# Patient Record
Sex: MALE | Race: WHITE | NOT HISPANIC OR LATINO | Employment: FULL TIME | URBAN - METROPOLITAN AREA
[De-identification: names, ages, dates, MRNs, and addresses within clinical notes are randomized per-mention and may not be internally consistent; named-entity substitution may affect disease eponyms.]

---

## 2019-05-26 ENCOUNTER — APPOINTMENT (EMERGENCY)
Dept: CT IMAGING | Facility: HOSPITAL | Age: 29
End: 2019-05-26

## 2019-05-26 ENCOUNTER — HOSPITAL ENCOUNTER (OUTPATIENT)
Facility: HOSPITAL | Age: 29
Setting detail: OBSERVATION
Discharge: HOME | End: 2019-05-29
Attending: EMERGENCY MEDICINE | Admitting: INTERNAL MEDICINE

## 2019-05-26 DIAGNOSIS — L02.91 ABSCESS: ICD-10-CM

## 2019-05-26 DIAGNOSIS — L03.211 FACIAL CELLULITIS: Primary | ICD-10-CM

## 2019-05-26 DIAGNOSIS — K02.9 DENTAL CARIES: ICD-10-CM

## 2019-05-26 DIAGNOSIS — F17.200 NICOTINE DEPENDENCE: ICD-10-CM

## 2019-05-26 LAB
ALBUMIN SERPL BCP-MCNC: 3.6 G/DL (ref 3.5–5)
ALP SERPL-CCNC: 102 U/L (ref 46–116)
ALT SERPL W P-5'-P-CCNC: 47 U/L (ref 12–78)
ANION GAP SERPL CALCULATED.3IONS-SCNC: 8 MMOL/L (ref 4–13)
AST SERPL W P-5'-P-CCNC: 18 U/L (ref 5–45)
BASOPHILS # BLD AUTO: 0.07 THOUSANDS/ΜL (ref 0–0.1)
BASOPHILS NFR BLD AUTO: 1 % (ref 0–1)
BILIRUB SERPL-MCNC: 0.6 MG/DL (ref 0.2–1)
BUN SERPL-MCNC: 14 MG/DL (ref 5–25)
CALCIUM SERPL-MCNC: 8.7 MG/DL (ref 8.3–10.1)
CHLORIDE SERPL-SCNC: 103 MMOL/L (ref 100–108)
CO2 SERPL-SCNC: 27 MMOL/L (ref 21–32)
CREAT SERPL-MCNC: 1.14 MG/DL (ref 0.6–1.3)
EOSINOPHIL # BLD AUTO: 0.2 THOUSAND/ΜL (ref 0–0.61)
EOSINOPHIL NFR BLD AUTO: 2 % (ref 0–6)
ERYTHROCYTE [DISTWIDTH] IN BLOOD BY AUTOMATED COUNT: 11.9 % (ref 11.6–15.1)
GFR SERPL CREATININE-BSD FRML MDRD: 88 ML/MIN/1.73SQ M
GLUCOSE SERPL-MCNC: 115 MG/DL (ref 65–140)
HCT VFR BLD AUTO: 41.1 % (ref 36.5–49.3)
HGB BLD-MCNC: 14.3 G/DL (ref 12–17)
IMM GRANULOCYTES # BLD AUTO: 0.03 THOUSAND/UL (ref 0–0.2)
IMM GRANULOCYTES NFR BLD AUTO: 0 % (ref 0–2)
LACTATE SERPL-SCNC: 0.8 MMOL/L (ref 0.5–2)
LYMPHOCYTES # BLD AUTO: 1.66 THOUSANDS/ΜL (ref 0.6–4.47)
LYMPHOCYTES NFR BLD AUTO: 15 % (ref 14–44)
MCH RBC QN AUTO: 32.1 PG (ref 26.8–34.3)
MCHC RBC AUTO-ENTMCNC: 34.8 G/DL (ref 31.4–37.4)
MCV RBC AUTO: 92 FL (ref 82–98)
MONOCYTES # BLD AUTO: 1.06 THOUSAND/ΜL (ref 0.17–1.22)
MONOCYTES NFR BLD AUTO: 9 % (ref 4–12)
NEUTROPHILS # BLD AUTO: 8.44 THOUSANDS/ΜL (ref 1.85–7.62)
NEUTS SEG NFR BLD AUTO: 73 % (ref 43–75)
NRBC BLD AUTO-RTO: 0 /100 WBCS
PLATELET # BLD AUTO: 144 THOUSANDS/UL (ref 149–390)
PMV BLD AUTO: 12.7 FL (ref 8.9–12.7)
POTASSIUM SERPL-SCNC: 3.7 MMOL/L (ref 3.5–5.3)
PROT SERPL-MCNC: 7.3 G/DL (ref 6.4–8.2)
RBC # BLD AUTO: 4.46 MILLION/UL (ref 3.88–5.62)
SODIUM SERPL-SCNC: 138 MMOL/L (ref 136–145)
WBC # BLD AUTO: 11.46 THOUSAND/UL (ref 4.31–10.16)

## 2019-05-26 PROCEDURE — 96361 HYDRATE IV INFUSION ADD-ON: CPT

## 2019-05-26 PROCEDURE — 87040 BLOOD CULTURE FOR BACTERIA: CPT | Performed by: EMERGENCY MEDICINE

## 2019-05-26 PROCEDURE — 83605 ASSAY OF LACTIC ACID: CPT | Performed by: EMERGENCY MEDICINE

## 2019-05-26 PROCEDURE — 93005 ELECTROCARDIOGRAM TRACING: CPT

## 2019-05-26 PROCEDURE — 85025 COMPLETE CBC W/AUTO DIFF WBC: CPT | Performed by: EMERGENCY MEDICINE

## 2019-05-26 PROCEDURE — 80053 COMPREHEN METABOLIC PANEL: CPT | Performed by: EMERGENCY MEDICINE

## 2019-05-26 PROCEDURE — 36415 COLL VENOUS BLD VENIPUNCTURE: CPT | Performed by: EMERGENCY MEDICINE

## 2019-05-26 PROCEDURE — 99284 EMERGENCY DEPT VISIT MOD MDM: CPT | Performed by: EMERGENCY MEDICINE

## 2019-05-26 PROCEDURE — 99285 EMERGENCY DEPT VISIT HI MDM: CPT

## 2019-05-26 PROCEDURE — 70487 CT MAXILLOFACIAL W/DYE: CPT

## 2019-05-26 RX ORDER — ACETAMINOPHEN 325 MG/1
650 TABLET ORAL ONCE
Status: COMPLETED | OUTPATIENT
Start: 2019-05-26 | End: 2019-05-26

## 2019-05-26 RX ADMIN — IOHEXOL 85 ML: 350 INJECTION, SOLUTION INTRAVENOUS at 23:45

## 2019-05-26 RX ADMIN — SODIUM CHLORIDE 1000 ML: 0.9 INJECTION, SOLUTION INTRAVENOUS at 22:52

## 2019-05-26 RX ADMIN — ACETAMINOPHEN 650 MG: 325 TABLET, FILM COATED ORAL at 21:35

## 2019-05-27 ENCOUNTER — ANESTHESIA EVENT (OUTPATIENT)
Dept: PERIOP | Facility: HOSPITAL | Age: 29
End: 2019-05-27

## 2019-05-27 ENCOUNTER — ANESTHESIA (OUTPATIENT)
Dept: PERIOP | Facility: HOSPITAL | Age: 29
End: 2019-05-27

## 2019-05-27 PROBLEM — K02.9 DENTAL CARIES: Status: ACTIVE | Noted: 2019-05-26

## 2019-05-27 PROBLEM — L03.211 FACIAL CELLULITIS: Status: ACTIVE | Noted: 2019-05-27

## 2019-05-27 PROBLEM — L02.91 ABSCESS: Status: ACTIVE | Noted: 2019-05-26

## 2019-05-27 PROBLEM — K04.7 DENTAL ABSCESS: Status: ACTIVE | Noted: 2019-05-27

## 2019-05-27 LAB
ANION GAP SERPL CALCULATED.3IONS-SCNC: 10 MMOL/L (ref 4–13)
ATRIAL RATE: 75 BPM
BASOPHILS # BLD AUTO: 0.05 THOUSANDS/ΜL (ref 0–0.1)
BASOPHILS NFR BLD AUTO: 1 % (ref 0–1)
BUN SERPL-MCNC: 11 MG/DL (ref 5–25)
CALCIUM SERPL-MCNC: 8.4 MG/DL (ref 8.3–10.1)
CHLORIDE SERPL-SCNC: 106 MMOL/L (ref 100–108)
CO2 SERPL-SCNC: 23 MMOL/L (ref 21–32)
CREAT SERPL-MCNC: 0.99 MG/DL (ref 0.6–1.3)
EOSINOPHIL # BLD AUTO: 0.22 THOUSAND/ΜL (ref 0–0.61)
EOSINOPHIL NFR BLD AUTO: 2 % (ref 0–6)
ERYTHROCYTE [DISTWIDTH] IN BLOOD BY AUTOMATED COUNT: 12 % (ref 11.6–15.1)
GFR SERPL CREATININE-BSD FRML MDRD: 104 ML/MIN/1.73SQ M
GLUCOSE SERPL-MCNC: 109 MG/DL (ref 65–140)
HCT VFR BLD AUTO: 38.7 % (ref 36.5–49.3)
HGB BLD-MCNC: 13.6 G/DL (ref 12–17)
IMM GRANULOCYTES # BLD AUTO: 0.03 THOUSAND/UL (ref 0–0.2)
IMM GRANULOCYTES NFR BLD AUTO: 0 % (ref 0–2)
LACTATE SERPL-SCNC: 0.5 MMOL/L (ref 0.5–2)
LYMPHOCYTES # BLD AUTO: 2.32 THOUSANDS/ΜL (ref 0.6–4.47)
LYMPHOCYTES NFR BLD AUTO: 23 % (ref 14–44)
MCH RBC QN AUTO: 32.2 PG (ref 26.8–34.3)
MCHC RBC AUTO-ENTMCNC: 35.1 G/DL (ref 31.4–37.4)
MCV RBC AUTO: 92 FL (ref 82–98)
MONOCYTES # BLD AUTO: 1.17 THOUSAND/ΜL (ref 0.17–1.22)
MONOCYTES NFR BLD AUTO: 11 % (ref 4–12)
NEUTROPHILS # BLD AUTO: 6.47 THOUSANDS/ΜL (ref 1.85–7.62)
NEUTS SEG NFR BLD AUTO: 63 % (ref 43–75)
NRBC BLD AUTO-RTO: 0 /100 WBCS
P AXIS: 60 DEGREES
PLATELET # BLD AUTO: 134 THOUSANDS/UL (ref 149–390)
PMV BLD AUTO: 12.7 FL (ref 8.9–12.7)
POTASSIUM SERPL-SCNC: 3.9 MMOL/L (ref 3.5–5.3)
PR INTERVAL: 138 MS
QRS AXIS: 79 DEGREES
QRSD INTERVAL: 144 MS
QT INTERVAL: 404 MS
QTC INTERVAL: 446 MS
RBC # BLD AUTO: 4.22 MILLION/UL (ref 3.88–5.62)
SODIUM SERPL-SCNC: 139 MMOL/L (ref 136–145)
T WAVE AXIS: 52 DEGREES
VENTRICULAR RATE: 73 BPM
WBC # BLD AUTO: 10.26 THOUSAND/UL (ref 4.31–10.16)

## 2019-05-27 PROCEDURE — 96365 THER/PROPH/DIAG IV INF INIT: CPT

## 2019-05-27 PROCEDURE — 80048 BASIC METABOLIC PNL TOTAL CA: CPT | Performed by: PHYSICIAN ASSISTANT

## 2019-05-27 PROCEDURE — 96375 TX/PRO/DX INJ NEW DRUG ADDON: CPT

## 2019-05-27 PROCEDURE — 85025 COMPLETE CBC W/AUTO DIFF WBC: CPT | Performed by: PHYSICIAN ASSISTANT

## 2019-05-27 PROCEDURE — 83605 ASSAY OF LACTIC ACID: CPT | Performed by: EMERGENCY MEDICINE

## 2019-05-27 PROCEDURE — 36415 COLL VENOUS BLD VENIPUNCTURE: CPT | Performed by: EMERGENCY MEDICINE

## 2019-05-27 PROCEDURE — 99219 PR INITIAL OBSERVATION CARE/DAY 50 MINUTES: CPT | Performed by: INTERNAL MEDICINE

## 2019-05-27 PROCEDURE — 93010 ELECTROCARDIOGRAM REPORT: CPT | Performed by: INTERNAL MEDICINE

## 2019-05-27 RX ORDER — DEXAMETHASONE SODIUM PHOSPHATE 10 MG/ML
INJECTION, SOLUTION INTRAMUSCULAR; INTRAVENOUS AS NEEDED
Status: DISCONTINUED | OUTPATIENT
Start: 2019-05-27 | End: 2019-05-27 | Stop reason: SURG

## 2019-05-27 RX ORDER — ROCURONIUM BROMIDE 10 MG/ML
INJECTION, SOLUTION INTRAVENOUS AS NEEDED
Status: DISCONTINUED | OUTPATIENT
Start: 2019-05-27 | End: 2019-05-27 | Stop reason: SURG

## 2019-05-27 RX ORDER — BUPIVACAINE HYDROCHLORIDE AND EPINEPHRINE 5; 5 MG/ML; UG/ML
INJECTION, SOLUTION EPIDURAL; INTRACAUDAL; PERINEURAL AS NEEDED
Status: DISCONTINUED | OUTPATIENT
Start: 2019-05-27 | End: 2019-05-27 | Stop reason: HOSPADM

## 2019-05-27 RX ORDER — ACETAMINOPHEN 325 MG/1
975 TABLET ORAL ONCE
Status: DISCONTINUED | OUTPATIENT
Start: 2019-05-27 | End: 2019-05-27

## 2019-05-27 RX ORDER — ACETAMINOPHEN 325 MG/1
650 TABLET ORAL EVERY 6 HOURS PRN
Status: DISCONTINUED | OUTPATIENT
Start: 2019-05-27 | End: 2019-05-29 | Stop reason: HOSPADM

## 2019-05-27 RX ORDER — KETOROLAC TROMETHAMINE 30 MG/ML
15 INJECTION, SOLUTION INTRAMUSCULAR; INTRAVENOUS EVERY 6 HOURS PRN
Status: DISPENSED | OUTPATIENT
Start: 2019-05-27 | End: 2019-05-29

## 2019-05-27 RX ORDER — SODIUM CHLORIDE, SODIUM LACTATE, POTASSIUM CHLORIDE, CALCIUM CHLORIDE 600; 310; 30; 20 MG/100ML; MG/100ML; MG/100ML; MG/100ML
INJECTION, SOLUTION INTRAVENOUS CONTINUOUS PRN
Status: DISCONTINUED | OUTPATIENT
Start: 2019-05-27 | End: 2019-05-27 | Stop reason: SURG

## 2019-05-27 RX ORDER — SODIUM CHLORIDE, SODIUM LACTATE, POTASSIUM CHLORIDE, CALCIUM CHLORIDE 600; 310; 30; 20 MG/100ML; MG/100ML; MG/100ML; MG/100ML
100 INJECTION, SOLUTION INTRAVENOUS CONTINUOUS
Status: DISCONTINUED | OUTPATIENT
Start: 2019-05-27 | End: 2019-05-28

## 2019-05-27 RX ORDER — FENTANYL CITRATE/PF 50 MCG/ML
50 SYRINGE (ML) INJECTION
Status: COMPLETED | OUTPATIENT
Start: 2019-05-27 | End: 2019-05-27

## 2019-05-27 RX ORDER — LIDOCAINE HYDROCHLORIDE 10 MG/ML
INJECTION, SOLUTION INFILTRATION; PERINEURAL AS NEEDED
Status: DISCONTINUED | OUTPATIENT
Start: 2019-05-27 | End: 2019-05-27 | Stop reason: SURG

## 2019-05-27 RX ORDER — NEOSTIGMINE METHYLSULFATE 1 MG/ML
INJECTION INTRAVENOUS AS NEEDED
Status: DISCONTINUED | OUTPATIENT
Start: 2019-05-27 | End: 2019-05-27 | Stop reason: SURG

## 2019-05-27 RX ORDER — ONDANSETRON 2 MG/ML
INJECTION INTRAMUSCULAR; INTRAVENOUS AS NEEDED
Status: DISCONTINUED | OUTPATIENT
Start: 2019-05-27 | End: 2019-05-27 | Stop reason: SURG

## 2019-05-27 RX ORDER — MIDAZOLAM HYDROCHLORIDE 1 MG/ML
INJECTION INTRAMUSCULAR; INTRAVENOUS AS NEEDED
Status: DISCONTINUED | OUTPATIENT
Start: 2019-05-27 | End: 2019-05-27 | Stop reason: SURG

## 2019-05-27 RX ORDER — DIPHENHYDRAMINE HYDROCHLORIDE 50 MG/ML
12.5 INJECTION INTRAMUSCULAR; INTRAVENOUS ONCE AS NEEDED
Status: DISCONTINUED | OUTPATIENT
Start: 2019-05-27 | End: 2019-05-27 | Stop reason: HOSPADM

## 2019-05-27 RX ORDER — SUCCINYLCHOLINE/SOD CL,ISO/PF 100 MG/5ML
SYRINGE (ML) INTRAVENOUS AS NEEDED
Status: DISCONTINUED | OUTPATIENT
Start: 2019-05-27 | End: 2019-05-27 | Stop reason: SURG

## 2019-05-27 RX ORDER — ONDANSETRON 2 MG/ML
4 INJECTION INTRAMUSCULAR; INTRAVENOUS ONCE AS NEEDED
Status: DISCONTINUED | OUTPATIENT
Start: 2019-05-27 | End: 2019-05-27 | Stop reason: HOSPADM

## 2019-05-27 RX ORDER — KETOROLAC TROMETHAMINE 30 MG/ML
15 INJECTION, SOLUTION INTRAMUSCULAR; INTRAVENOUS ONCE
Status: COMPLETED | OUTPATIENT
Start: 2019-05-27 | End: 2019-05-27

## 2019-05-27 RX ORDER — NICOTINE 21 MG/24HR
1 PATCH, TRANSDERMAL 24 HOURS TRANSDERMAL DAILY
Status: DISCONTINUED | OUTPATIENT
Start: 2019-05-27 | End: 2019-05-29 | Stop reason: HOSPADM

## 2019-05-27 RX ORDER — METOCLOPRAMIDE HYDROCHLORIDE 5 MG/ML
10 INJECTION INTRAMUSCULAR; INTRAVENOUS ONCE AS NEEDED
Status: DISCONTINUED | OUTPATIENT
Start: 2019-05-27 | End: 2019-05-27 | Stop reason: HOSPADM

## 2019-05-27 RX ORDER — GLYCOPYRROLATE 0.2 MG/ML
INJECTION INTRAMUSCULAR; INTRAVENOUS AS NEEDED
Status: DISCONTINUED | OUTPATIENT
Start: 2019-05-27 | End: 2019-05-27 | Stop reason: SURG

## 2019-05-27 RX ORDER — ONDANSETRON 2 MG/ML
4 INJECTION INTRAMUSCULAR; INTRAVENOUS EVERY 6 HOURS PRN
Status: DISCONTINUED | OUTPATIENT
Start: 2019-05-27 | End: 2019-05-29 | Stop reason: HOSPADM

## 2019-05-27 RX ORDER — ALBUTEROL SULFATE 2.5 MG/3ML
2.5 SOLUTION RESPIRATORY (INHALATION) AS NEEDED
Status: DISCONTINUED | OUTPATIENT
Start: 2019-05-27 | End: 2019-05-27 | Stop reason: HOSPADM

## 2019-05-27 RX ORDER — KETOROLAC TROMETHAMINE 30 MG/ML
30 INJECTION, SOLUTION INTRAMUSCULAR; INTRAVENOUS ONCE
Status: COMPLETED | OUTPATIENT
Start: 2019-05-27 | End: 2019-05-27

## 2019-05-27 RX ORDER — FENTANYL CITRATE 50 UG/ML
INJECTION, SOLUTION INTRAMUSCULAR; INTRAVENOUS AS NEEDED
Status: DISCONTINUED | OUTPATIENT
Start: 2019-05-27 | End: 2019-05-27 | Stop reason: SURG

## 2019-05-27 RX ORDER — SODIUM CHLORIDE 9 MG/ML
INJECTION, SOLUTION INTRAVENOUS AS NEEDED
Status: DISCONTINUED | OUTPATIENT
Start: 2019-05-27 | End: 2019-05-27 | Stop reason: HOSPADM

## 2019-05-27 RX ORDER — CHLORHEXIDINE GLUCONATE 0.12 MG/ML
RINSE ORAL AS NEEDED
Status: DISCONTINUED | OUTPATIENT
Start: 2019-05-27 | End: 2019-05-27 | Stop reason: HOSPADM

## 2019-05-27 RX ADMIN — SODIUM CHLORIDE, SODIUM LACTATE, POTASSIUM CHLORIDE, AND CALCIUM CHLORIDE: .6; .31; .03; .02 INJECTION, SOLUTION INTRAVENOUS at 11:25

## 2019-05-27 RX ADMIN — FENTANYL CITRATE 50 MCG: 50 INJECTION INTRAMUSCULAR; INTRAVENOUS at 12:05

## 2019-05-27 RX ADMIN — SODIUM CHLORIDE 3 G: 9 INJECTION, SOLUTION INTRAVENOUS at 13:57

## 2019-05-27 RX ADMIN — SODIUM CHLORIDE 3 G: 9 INJECTION, SOLUTION INTRAVENOUS at 06:01

## 2019-05-27 RX ADMIN — FENTANYL CITRATE 50 MCG: 50 INJECTION, SOLUTION INTRAMUSCULAR; INTRAVENOUS at 12:46

## 2019-05-27 RX ADMIN — NEOSTIGMINE METHYLSULFATE 1 MG: 1 INJECTION INTRAVENOUS at 12:19

## 2019-05-27 RX ADMIN — ACETAMINOPHEN 650 MG: 325 TABLET, FILM COATED ORAL at 23:59

## 2019-05-27 RX ADMIN — ROCURONIUM BROMIDE 10 MG: 10 INJECTION, SOLUTION INTRAVENOUS at 12:00

## 2019-05-27 RX ADMIN — MIDAZOLAM HYDROCHLORIDE 2 MG: 1 INJECTION, SOLUTION INTRAMUSCULAR; INTRAVENOUS at 11:47

## 2019-05-27 RX ADMIN — DEXAMETHASONE SODIUM PHOSPHATE 4 MG: 10 INJECTION, SOLUTION INTRAMUSCULAR; INTRAVENOUS at 12:00

## 2019-05-27 RX ADMIN — NICOTINE 1 PATCH: 21 PATCH, EXTENDED RELEASE TRANSDERMAL at 11:13

## 2019-05-27 RX ADMIN — KETOROLAC TROMETHAMINE 15 MG: 30 INJECTION, SOLUTION INTRAMUSCULAR at 00:45

## 2019-05-27 RX ADMIN — AMPICILLIN AND SULBACTAM 3 G: 2; 1 INJECTION, POWDER, FOR SOLUTION INTRAMUSCULAR; INTRAVENOUS at 00:55

## 2019-05-27 RX ADMIN — FENTANYL CITRATE 100 MCG: 50 INJECTION INTRAMUSCULAR; INTRAVENOUS at 12:00

## 2019-05-27 RX ADMIN — FENTANYL CITRATE 50 MCG: 50 INJECTION, SOLUTION INTRAMUSCULAR; INTRAVENOUS at 12:41

## 2019-05-27 RX ADMIN — Medication 100 MG: at 11:53

## 2019-05-27 RX ADMIN — ONDANSETRON 4 MG: 2 INJECTION INTRAMUSCULAR; INTRAVENOUS at 12:00

## 2019-05-27 RX ADMIN — KETOROLAC TROMETHAMINE 15 MG: 30 INJECTION, SOLUTION INTRAMUSCULAR at 05:48

## 2019-05-27 RX ADMIN — ACETAMINOPHEN 650 MG: 325 TABLET, FILM COATED ORAL at 11:09

## 2019-05-27 RX ADMIN — GLYCOPYRROLATE 0.2 MG: 0.2 INJECTION, SOLUTION INTRAMUSCULAR; INTRAVENOUS at 11:53

## 2019-05-27 RX ADMIN — LIDOCAINE HYDROCHLORIDE ANHYDROUS 100 MG: 10 INJECTION, SOLUTION INFILTRATION at 11:53

## 2019-05-27 RX ADMIN — KETOROLAC TROMETHAMINE 30 MG: 30 INJECTION, SOLUTION INTRAMUSCULAR; INTRAVENOUS at 12:50

## 2019-05-27 RX ADMIN — SODIUM CHLORIDE 3 G: 9 INJECTION, SOLUTION INTRAVENOUS at 19:18

## 2019-05-27 RX ADMIN — GLYCOPYRROLATE 0.2 MG: 0.2 INJECTION, SOLUTION INTRAMUSCULAR; INTRAVENOUS at 12:19

## 2019-05-28 PROCEDURE — 99225 PR SBSQ OBSERVATION CARE/DAY 25 MINUTES: CPT | Performed by: HOSPITALIST

## 2019-05-28 RX ORDER — AMOXICILLIN AND CLAVULANATE POTASSIUM 875; 125 MG/1; MG/1
1 TABLET, FILM COATED ORAL EVERY 12 HOURS SCHEDULED
Status: DISCONTINUED | OUTPATIENT
Start: 2019-05-28 | End: 2019-05-29 | Stop reason: HOSPADM

## 2019-05-28 RX ADMIN — SODIUM CHLORIDE 3 G: 9 INJECTION, SOLUTION INTRAVENOUS at 12:59

## 2019-05-28 RX ADMIN — AMOXICILLIN AND CLAVULANATE POTASSIUM 1 TABLET: 875; 125 TABLET, FILM COATED ORAL at 15:41

## 2019-05-28 RX ADMIN — ACETAMINOPHEN 650 MG: 325 TABLET, FILM COATED ORAL at 23:47

## 2019-05-28 RX ADMIN — AMOXICILLIN AND CLAVULANATE POTASSIUM 1 TABLET: 875; 125 TABLET, FILM COATED ORAL at 21:52

## 2019-05-28 RX ADMIN — KETOROLAC TROMETHAMINE 15 MG: 30 INJECTION, SOLUTION INTRAMUSCULAR at 17:44

## 2019-05-28 RX ADMIN — SODIUM CHLORIDE 3 G: 9 INJECTION, SOLUTION INTRAVENOUS at 06:07

## 2019-05-28 RX ADMIN — SODIUM CHLORIDE 3 G: 9 INJECTION, SOLUTION INTRAVENOUS at 00:36

## 2019-05-28 RX ADMIN — NICOTINE 1 PATCH: 21 PATCH, EXTENDED RELEASE TRANSDERMAL at 09:00

## 2019-05-28 RX ADMIN — KETOROLAC TROMETHAMINE 15 MG: 30 INJECTION, SOLUTION INTRAMUSCULAR at 23:48

## 2019-05-29 VITALS
OXYGEN SATURATION: 98 % | DIASTOLIC BLOOD PRESSURE: 79 MMHG | SYSTOLIC BLOOD PRESSURE: 133 MMHG | WEIGHT: 209 LBS | RESPIRATION RATE: 18 BRPM | HEART RATE: 69 BPM | HEIGHT: 70 IN | TEMPERATURE: 97.9 F | BODY MASS INDEX: 29.92 KG/M2

## 2019-05-29 LAB
ANION GAP SERPL CALCULATED.3IONS-SCNC: 9 MMOL/L (ref 4–13)
BUN SERPL-MCNC: 15 MG/DL (ref 5–25)
CALCIUM SERPL-MCNC: 8.9 MG/DL (ref 8.3–10.1)
CHLORIDE SERPL-SCNC: 104 MMOL/L (ref 100–108)
CO2 SERPL-SCNC: 28 MMOL/L (ref 21–32)
CREAT SERPL-MCNC: 1.31 MG/DL (ref 0.6–1.3)
ERYTHROCYTE [DISTWIDTH] IN BLOOD BY AUTOMATED COUNT: 12.1 % (ref 11.6–15.1)
GFR SERPL CREATININE-BSD FRML MDRD: 74 ML/MIN/1.73SQ M
GLUCOSE P FAST SERPL-MCNC: 97 MG/DL (ref 65–99)
GLUCOSE SERPL-MCNC: 97 MG/DL (ref 65–140)
HCT VFR BLD AUTO: 40.1 % (ref 36.5–49.3)
HGB BLD-MCNC: 13.9 G/DL (ref 12–17)
MCH RBC QN AUTO: 32.3 PG (ref 26.8–34.3)
MCHC RBC AUTO-ENTMCNC: 34.7 G/DL (ref 31.4–37.4)
MCV RBC AUTO: 93 FL (ref 82–98)
PLATELET # BLD AUTO: 135 THOUSANDS/UL (ref 149–390)
PMV BLD AUTO: 12.5 FL (ref 8.9–12.7)
POTASSIUM SERPL-SCNC: 4.2 MMOL/L (ref 3.5–5.3)
RBC # BLD AUTO: 4.31 MILLION/UL (ref 3.88–5.62)
SODIUM SERPL-SCNC: 141 MMOL/L (ref 136–145)
WBC # BLD AUTO: 6.32 THOUSAND/UL (ref 4.31–10.16)

## 2019-05-29 PROCEDURE — 85027 COMPLETE CBC AUTOMATED: CPT | Performed by: HOSPITALIST

## 2019-05-29 PROCEDURE — 80048 BASIC METABOLIC PNL TOTAL CA: CPT | Performed by: HOSPITALIST

## 2019-05-29 PROCEDURE — 99217 PR OBSERVATION CARE DISCHARGE MANAGEMENT: CPT | Performed by: HOSPITALIST

## 2019-05-29 RX ORDER — NICOTINE 21 MG/24HR
1 PATCH, TRANSDERMAL 24 HOURS TRANSDERMAL DAILY
Qty: 28 PATCH | Refills: 1 | Status: SHIPPED | OUTPATIENT
Start: 2019-05-30

## 2019-05-29 RX ORDER — AMOXICILLIN AND CLAVULANATE POTASSIUM 875; 125 MG/1; MG/1
1 TABLET, FILM COATED ORAL EVERY 12 HOURS SCHEDULED
Qty: 17 TABLET | Refills: 0 | Status: SHIPPED | OUTPATIENT
Start: 2019-05-29 | End: 2019-06-07

## 2019-05-29 RX ADMIN — AMOXICILLIN AND CLAVULANATE POTASSIUM 1 TABLET: 875; 125 TABLET, FILM COATED ORAL at 08:39

## 2019-06-01 LAB
BACTERIA BLD CULT: NORMAL
BACTERIA BLD CULT: NORMAL

## 2019-09-02 ENCOUNTER — HOSPITAL ENCOUNTER (EMERGENCY)
Facility: HOSPITAL | Age: 29
Discharge: HOME | End: 2019-09-02
Attending: EMERGENCY MEDICINE | Admitting: EMERGENCY MEDICINE

## 2019-09-02 VITALS
WEIGHT: 210 LBS | RESPIRATION RATE: 18 BRPM | HEART RATE: 80 BPM | DIASTOLIC BLOOD PRESSURE: 93 MMHG | SYSTOLIC BLOOD PRESSURE: 138 MMHG | OXYGEN SATURATION: 95 % | TEMPERATURE: 98.4 F | BODY MASS INDEX: 30.13 KG/M2

## 2019-09-02 DIAGNOSIS — K08.89 DENTALGIA: Primary | ICD-10-CM

## 2019-09-02 PROCEDURE — 99284 EMERGENCY DEPT VISIT MOD MDM: CPT | Performed by: PHYSICIAN ASSISTANT

## 2019-09-02 PROCEDURE — 64400 NJX AA&/STRD TRIGEMINAL NRV: CPT | Performed by: PHYSICIAN ASSISTANT

## 2019-09-02 PROCEDURE — 99283 EMERGENCY DEPT VISIT LOW MDM: CPT

## 2019-09-02 RX ORDER — AMOXICILLIN AND CLAVULANATE POTASSIUM 875; 125 MG/1; MG/1
1 TABLET, FILM COATED ORAL EVERY 12 HOURS
Qty: 14 TABLET | Refills: 0 | Status: CANCELLED | OUTPATIENT
Start: 2019-09-02

## 2019-09-02 RX ORDER — IBUPROFEN 600 MG/1
600 TABLET ORAL ONCE
Status: COMPLETED | OUTPATIENT
Start: 2019-09-02 | End: 2019-09-02

## 2019-09-02 RX ORDER — PENICILLIN V POTASSIUM 500 MG/1
500 TABLET ORAL EVERY 8 HOURS SCHEDULED
Qty: 30 TABLET | Refills: 0 | Status: SHIPPED | OUTPATIENT
Start: 2019-09-02 | End: 2019-09-12

## 2019-09-02 RX ORDER — BUPIVACAINE HYDROCHLORIDE 5 MG/ML
6 INJECTION, SOLUTION EPIDURAL; INTRACAUDAL ONCE
Status: COMPLETED | OUTPATIENT
Start: 2019-09-02 | End: 2019-09-02

## 2019-09-02 RX ORDER — LIDOCAINE HYDROCHLORIDE 10 MG/ML
6 INJECTION, SOLUTION EPIDURAL; INFILTRATION; INTRACAUDAL; PERINEURAL ONCE
Status: COMPLETED | OUTPATIENT
Start: 2019-09-02 | End: 2019-09-02

## 2019-09-02 RX ADMIN — BENZOCAINE 1 APPLICATION: 220 GEL, DENTIFRICE DENTAL at 22:19

## 2019-09-02 RX ADMIN — LIDOCAINE HYDROCHLORIDE 6 ML: 10 INJECTION, SOLUTION EPIDURAL; INFILTRATION; INTRACAUDAL; PERINEURAL at 22:21

## 2019-09-02 RX ADMIN — BUPIVACAINE HYDROCHLORIDE 6 ML: 5 INJECTION, SOLUTION EPIDURAL; INTRACAUDAL; PERINEURAL at 22:21

## 2019-09-02 RX ADMIN — IBUPROFEN 600 MG: 600 TABLET ORAL at 22:18

## 2019-09-03 NOTE — ED NOTES
Pt discharge instructions reviewed by ABISAI Patel, Pt has no further questions at this time  Pt awake and alert, no signs of acute distress noted  Pt ambulated out of ED with a steady gait       Cole Nicholson RN  09/02/19 2255

## 2019-09-05 NOTE — ED PROVIDER NOTES
Pt Name: Kelle Monaco  MRN: [id number]  YOB: 1990  Age/Sex: 33 y o  male  Date of evaluation: 9/2/2019  PCP: No primary care provider on file  CHIEF COMPLAINT    Chief Complaint   Patient presents with    Dental Pain     Pt reports his tooth broke about 2 days ago, reports dental pain top left of mouth where the tooth broke  Reports we removed 8 teeth about a month  Reports he was seen at [facility] earlier and recieved 2 scripts for naproxen and amoxicillin but has not filled them  HPI    Salvador Adams presents to the Emergency Department complaining of Dental Pain  Kelle Monaco  is a 33 y o  male who presents due to Dental Pain  Pt reports Dental Pain, sts his tooth broke about 2 days ago, has been having pain that has been difficult to tolerate with eating/drinking, went to [facility] and was given abx, naproxen but did not fill either as he did not have money to do so  Pt sts he took 1 Augmentin PTA from script from his sister  Significant PMH of multiple dental extractions s/p significant dental abscess 1 month ago with admission [facility]  Denies facial swelling, f/c/s, trismus, weakness, fatigue, and no other complaints at this time          History provided by:  Patient   used: No    Dental Pain   Location:  Upper  Upper teeth location:  10/BRITANY lateral incisor  Quality:  Throbbing  Severity:  Moderate  Onset quality:  Gradual  Timing:  Constant  Progression:  Waxing and waning  Chronicity:  New  Context: dental caries, dental fracture, poor dentition and recent dental surgery    Context: not abscess, cap still on, not crown fracture, not enamel fracture, filling intact, not intrusion, not malocclusion and not trauma    Relieved by:  Nothing  Worsened by:  Cold food/drink and hot food/drink  Ineffective treatments:  None tried  Associated symptoms: no congestion, no difficulty swallowing, no drooling, no facial pain, no facial swelling, no fever, no gum swelling, no headaches, no neck pain, no neck swelling, no oral bleeding, no oral lesions and no trismus    Risk factors: lack of dental care, periodontal disease and smoking    Risk factors: no alcohol problem, no cancer, no chewing tobacco use, no diabetes and no immunosuppression          Past Medical and Surgical History    History reviewed  No pertinent past medical history  Past Surgical History:   Procedure Laterality Date    INCISION AND DRAINAGE INTRA ORAL ABSCESS Right 5/27/2019    Procedure: INCISION AND DRAINAGE  (I&D) R CANINE SPACE;  Surgeon: Tony Harley DMD;  [facility]: [facility];  Service: Maxillofacial    NJ IMPACT TOOTH REMOV COMP BONY N/A 5/27/2019    Procedure: EXTRACTION TEETH #1,2,7,8,10,15,16,17;  Surgeon: Tony Harley DMD;  Location: [facility];  Service: Maxillofacial       History reviewed  No pertinent family history  Social History     Tobacco Use    Smoking status: Current Every Day Smoker     Packs/day: 1 00    Smokeless tobacco: Current User   Substance Use Topics    Alcohol use: Yes    Drug use: Yes     Types: Marijuana           Allergies    No Known Allergies    Home Medications:    Prior to Admission medications    Medication Sig Start Date End Date Taking? Authorizing Provider   benzocaine (HURRICAINE) 20 % 1 application by Mucosal route 4 (four) times a day as needed for mucositis for up to 10 days 9/2/19 9/12/19  Savita Farrell PA-C   nicotine (NICODERM CQ) 21 mg/24 hr TD 24 hr patch Place 1 patch on the skin daily  Patient not taking: Reported on 9/2/2019 5/30/19   Shiraz Richardson MD   penicillin V potassium (VEETID) 500 mg tablet Take 1 tablet (500 mg total) by mouth every 8 (eight) hours for 10 days 9/2/19 9/12/19  Savita Farrell PA-C           Review of Systems    Review of Systems   Constitutional: Negative for activity change, appetite change, chills, diaphoresis, fatigue and fever  HENT: Positive for dental problem   Negative for congestion, drooling, ear discharge, ear pain, facial swelling, mouth sores, postnasal drip, rhinorrhea, sinus pressure, sinus pain, sore throat, trouble swallowing and voice change  Eyes: Negative for discharge  Respiratory: Negative for apnea, cough, choking, chest tightness, shortness of breath, wheezing and stridor  Cardiovascular: Negative for chest pain  Gastrointestinal: Negative for abdominal pain  Musculoskeletal: Negative for arthralgias, joint swelling and neck pain  Skin: Negative for rash  Neurological: Negative for dizziness, light-headedness and headaches  Psychiatric/Behavioral: The patient is not nervous/anxious  All other systems reviewed and negative  Physical Exam      ED Triage Vitals [09/02/19 2141]   Temperature Pulse Respirations Blood Pressure SpO2   98 4 °F (36 9 °C) 80 18 138/93 95 %      Temp Source Heart Rate Source Patient Position - Orthostatic VS BP Location FiO2 (%)   Oral Monitor Sitting Right arm --      Pain Score       6               Physical Exam   Constitutional: He is oriented to person, place, and time  He appears well-developed and well-nourished  No distress  HENT:   Head: Normocephalic and atraumatic  Mouth/Throat: Uvula is midline, oropharynx is clear and moist and mucous membranes are normal  Dental caries present  No dental abscesses  Multiple extractions   Eyes: Pupils are equal, round, and reactive to light  Conjunctivae and EOM are normal    Neck: Normal range of motion  Neck supple  Cardiovascular: Normal rate, regular rhythm, normal heart sounds and intact distal pulses  Exam reveals no gallop and no friction rub  No murmur heard  Pulmonary/Chest: Effort normal and breath sounds normal  No respiratory distress  He has no wheezes  He has no rales  He exhibits no tenderness  Musculoskeletal: Normal range of motion  Neurological: He is alert and oriented to person, place, and time  Skin: Skin is warm   Capillary refill takes less than 2 seconds  He is not diaphoretic  Nursing note and vitals reviewed  Diagnostic Results    ECG      Labs:    Results for orders placed or performed during the hospital encounter of 05/26/19   Blood culture #1   Result Value Ref Range    Blood Culture No Growth After 5 Days  Blood culture #2   Result Value Ref Range    Blood Culture No Growth After 5 Days      Comprehensive metabolic panel   Result Value Ref Range    Sodium 138 136 - 145 mmol/L    Potassium 3 7 3 5 - 5 3 mmol/L    Chloride 103 100 - 108 mmol/L    CO2 27 21 - 32 mmol/L    ANION GAP 8 4 - 13 mmol/L    BUN 14 5 - 25 mg/dL    Creatinine 1 14 0 60 - 1 30 mg/dL    Glucose 115 65 - 140 mg/dL    Calcium 8 7 8 3 - 10 1 mg/dL    AST 18 5 - 45 U/L    ALT 47 12 - 78 U/L    Alkaline Phosphatase 102 46 - 116 U/L    Total Protein 7 3 6 4 - 8 2 g/dL    Albumin 3 6 3 5 - 5 0 g/dL    Total Bilirubin 0 60 0 20 - 1 00 mg/dL    eGFR 88 ml/min/1 73sq m   CBC and differential   Result Value Ref Range    WBC 11 46 (H) 4 31 - 10 16 Thousand/uL    RBC 4 46 3 88 - 5 62 Million/uL    Hemoglobin 14 3 12 0 - 17 0 g/dL    Hematocrit 41 1 36 5 - 49 3 %    MCV 92 82 - 98 fL    MCH 32 1 26 8 - 34 3 pg    MCHC 34 8 31 4 - 37 4 g/dL    RDW 11 9 11 6 - 15 1 %    MPV 12 7 8 9 - 12 7 fL    Platelets 643 (L) 476 - 390 Thousands/uL    nRBC 0 /100 WBCs    Neutrophils Relative 73 43 - 75 %    Immat GRANS % 0 0 - 2 %    Lymphocytes Relative 15 14 - 44 %    Monocytes Relative 9 4 - 12 %    Eosinophils Relative 2 0 - 6 %    Basophils Relative 1 0 - 1 %    Neutrophils Absolute 8 44 (H) 1 85 - 7 62 Thousands/µL    Immature Grans Absolute 0 03 0 00 - 0 20 Thousand/uL    Lymphocytes Absolute 1 66 0 60 - 4 47 Thousands/µL    Monocytes Absolute 1 06 0 17 - 1 22 Thousand/µL    Eosinophils Absolute 0 20 0 00 - 0 61 Thousand/µL    Basophils Absolute 0 07 0 00 - 0 10 Thousands/µL   Lactic acid x2 Q2H   Result Value Ref Range    LACTIC ACID 0 8 0 5 - 2 0 mmol/L   Lactic acid x2 Q2H   Result Value Ref Range    LACTIC ACID 0 5 0 5 - 2 0 mmol/L   Basic metabolic panel   Result Value Ref Range    Sodium 139 136 - 145 mmol/L    Potassium 3 9 3 5 - 5 3 mmol/L    Chloride 106 100 - 108 mmol/L    CO2 23 21 - 32 mmol/L    ANION GAP 10 4 - 13 mmol/L    BUN 11 5 - 25 mg/dL    Creatinine 0 99 0 60 - 1 30 mg/dL    Glucose 109 65 - 140 mg/dL    Calcium 8 4 8 3 - 10 1 mg/dL    eGFR 104 ml/min/1 73sq m   CBC and differential   Result Value Ref Range    WBC 10 26 (H) 4 31 - 10 16 Thousand/uL    RBC 4 22 3 88 - 5 62 Million/uL    Hemoglobin 13 6 12 0 - 17 0 g/dL    Hematocrit 38 7 36 5 - 49 3 %    MCV 92 82 - 98 fL    MCH 32 2 26 8 - 34 3 pg    MCHC 35 1 31 4 - 37 4 g/dL    RDW 12 0 11 6 - 15 1 %    MPV 12 7 8 9 - 12 7 fL    Platelets 192 (L) 977 - 390 Thousands/uL    nRBC 0 /100 WBCs    Neutrophils Relative 63 43 - 75 %    Immat GRANS % 0 0 - 2 %    Lymphocytes Relative 23 14 - 44 %    Monocytes Relative 11 4 - 12 %    Eosinophils Relative 2 0 - 6 %    Basophils Relative 1 0 - 1 %    Neutrophils Absolute 6 47 1 85 - 7 62 Thousands/µL    Immature Grans Absolute 0 03 0 00 - 0 20 Thousand/uL    Lymphocytes Absolute 2 32 0 60 - 4 47 Thousands/µL    Monocytes Absolute 1 17 0 17 - 1 22 Thousand/µL    Eosinophils Absolute 0 22 0 00 - 0 61 Thousand/µL    Basophils Absolute 0 05 0 00 - 0 10 Thousands/µL   CBC   Result Value Ref Range    WBC 6 32 4 31 - 10 16 Thousand/uL    RBC 4 31 3 88 - 5 62 Million/uL    Hemoglobin 13 9 12 0 - 17 0 g/dL    Hematocrit 40 1 36 5 - 49 3 %    MCV 93 82 - 98 fL    MCH 32 3 26 8 - 34 3 pg    MCHC 34 7 31 4 - 37 4 g/dL    RDW 12 1 11 6 - 15 1 %    Platelets 919 (L) 662 - 390 Thousands/uL    MPV 12 5 8 9 - 12 7 fL   Basic metabolic panel   Result Value Ref Range    Sodium 141 136 - 145 mmol/L    Potassium 4 2 3 5 - 5 3 mmol/L    Chloride 104 100 - 108 mmol/L    CO2 28 21 - 32 mmol/L    ANION GAP 9 4 - 13 mmol/L    BUN 15 5 - 25 mg/dL    Creatinine 1 31 (H) 0 60 - 1 30 mg/dL Glucose 97 65 - 140 mg/dL    Glucose, Fasting 97 65 - 99 mg/dL    Calcium 8 9 8 3 - 10 1 mg/dL    eGFR 74 ml/min/1 73sq m   ECG 12 lead   Result Value Ref Range    Ventricular Rate 73 BPM    Atrial Rate 75 BPM    CO Interval 138 ms    QRSD Interval 144 ms    QT Interval 404 ms    QTC Interval 446 ms    P Axis 60 degrees    QRS Axis 79 degrees    T Wave Axis 52 degrees       All labs reviewed and utilized in the medical decision making process    Radiology:    No orders to display       All radiology studies independently viewed by me and interpreted by the radiologist     Procedure    Nerve block  Date/Time: 9/2/2019 10:40 PM  Performed by: Brent Nazario PA-C  Authorized by: Brent Nazario PA-C     Patient location:  ED  Consent:     Consent obtained:  Verbal    Consent given by:  Patient    Risks discussed: Allergic reaction, bleeding, infection, intravenous injection, nerve damage, pain, unsuccessful block and swelling    Alternatives discussed:  No treatment, delayed treatment, alternative treatment and referral  Universal protocol:     Procedure explained and questions answered to patient or proxy's satisfaction: yes      Patient identity confirmed:  Verbally with patient, arm band and hospital-assigned identification number  Indications:     Indications:  Pain relief  Location:     Body area:  Head    Head nerve blocked: infraorbital     Laterality:  Left  Pre-procedure details:     Skin preparation:  Povidone-iodine    Preparation: Patient was prepped and draped in usual sterile fashion    Skin anesthesia (see MAR for exact dosages):     Skin anesthesia method:  Topical application    Topical anesthetic:  Benzocaine gel  Procedure details (see MAR for exact dosages):      Block needle gauge:  25 G    Anesthetic injected:  Bupivacaine 0 5% w/o epi and lidocaine 1% w/o epi    Steroid injected:  None    Additive injected:  None    Injection procedure:  Anatomic landmarks identified, anatomic landmarks palpated, incremental injection, introduced needle and negative aspiration for blood  Post-procedure details:     Dressing:  None    Outcome:  Pain improved    Patient tolerance of procedure: Tolerated well, no immediate complications          Assessment and Plan    MDM  Number of Diagnoses or Management Options  Dentalgia: new, needed workup     Amount and/or Complexity of Data Reviewed  Review and summarize past medical records: yes    Risk of Complications, Morbidity, and/or Mortality  Presenting problems: moderate  Diagnostic procedures: moderate  Management options: moderate    Patient Progress  Patient progress: improved      Initial ED assessment:  Silas Rojas  is a 33 y o  male with significant PMH for recent admission due to dental abscess with extractions who presents with Dental Pain  Vitals signs reviewed and WNL  Physical examination remarkable for TTP over tooth with possible fracture, poor dentition  Initial Ddx  includes but is not limited to:   dental sagar, dental infection, dental abscess, dry socket, gingivitis; doubt sera's angina  Initial ED plan:   Plan will be to perform and treat symptomatically with dental block  Final ED summary/disposition: Home care recommendations given with discharge paperwork  Return to ED instructions given if new/worsening sxs        MDM  Reviewed: previous chart, nursing note and vitals        ED Course of Care and Re-Assessments                            Medications   ibuprofen (MOTRIN) tablet 600 mg (600 mg Oral Given 9/2/19 2218)   BENZOCAINE (DENTAL) 20 % swab 1 application (1 application Oral Given 6/9/65 2219)   lidocaine (PF) (XYLOCAINE-MPF) 1 % injection 6 mL (6 mL Infiltration Given by Other 9/2/19 2221)   bupivacaine (PF) (MARCAINE) 0 5 % injection 6 mL (6 mL Infiltration Given by Other 9/2/19 2221)         FINAL IMPRESSION    Final diagnoses:   Elias Camera         DISPOSITION/PLAN  Time reflects when diagnosis was documented in both MDM as applicable and the Disposition within this note     Time User Action Codes Description Comment    9/2/2019 10:16 PM Noble Lesch Add [[id number]] ANGEL WHITT  [facility]       ED Disposition     ED Disposition Condition Date/Time Comment    Discharge Stable Mon Sep 2, 2019 10:16 PM Logan Laboy  discharge to home/self care              Follow-up Information     Follow up With Specialties Details Why Contact Info Additional [address] Emergency Department Emergency Medicine Go to  For follow up [address]  [facility] ED, [address], [place], [place], Alber ROMAN [address]  Call  to establish primary care, For follow up [phone]       [address] [facility] Internal Medicine [place] [facility] Call  For follow up [address] [address]  [address] [facility] Internal Medicine [place], [address] [place] [address], [place], [place], [facility] [address] Adult and [address] e to  For follow up   [address]  [phone]               PATIENT REFERRED TO:    [address] Emergency Department  [address] [facility] [address] [phone]  Go to   For follow up    Kahlil Aguilera  [phone]    Call   to establish primary care, For follow up    [address] [facility] [address] [place] [address] Avenue  [phone]  Call   For follow up    [address] Adult and [address]    [address]  [phone]  Go to   For follow up      DISCHARGE MEDICATIONS:    Discharge Medication List as of 9/2/2019 10:35 PM      START taking these medications    Details   benzocaine (HURRICAINE) 20 % 1 application by Mucosal route 4 (four) times a day as needed for mucositis for up to 10 days, Starting Mon 9/2/2019, Until Thu 9/12/2019, Print      penicillin V potassium (VEETID) 500 mg tablet Take 1 tablet (500 mg total) by mouth every 8 (eight) hours for 10 days, Starting Mon 9/2/2019, Until Thu 9/12/2019, Print         CONTINUE these medications which have NOT CHANGED    Details   nicotine (NICODERM CQ) 21 mg/24 hr TD 24 hr patch Place 1 patch on the skin daily, Starting Thu 5/30/2019, Print             No discharge procedures on file           ABISAI Olivo PA-C  09/04/19 [id number]

## 2019-11-06 ENCOUNTER — HOSPITAL ENCOUNTER (EMERGENCY)
Facility: HOSPITAL | Age: 29
Discharge: HOME | End: 2019-11-06
Attending: EMERGENCY MEDICINE | Admitting: EMERGENCY MEDICINE

## 2019-11-06 VITALS
TEMPERATURE: 98.7 F | OXYGEN SATURATION: 100 % | RESPIRATION RATE: 14 BRPM | HEART RATE: 56 BPM | SYSTOLIC BLOOD PRESSURE: 146 MMHG | DIASTOLIC BLOOD PRESSURE: 85 MMHG

## 2019-11-06 DIAGNOSIS — K02.9 DENTAL CARIES: ICD-10-CM

## 2019-11-06 DIAGNOSIS — K08.89 DENTALGIA: ICD-10-CM

## 2019-11-06 DIAGNOSIS — S09.93XA DENTAL INJURY: Primary | ICD-10-CM

## 2019-11-06 PROCEDURE — 99284 EMERGENCY DEPT VISIT MOD MDM: CPT | Performed by: PHYSICIAN ASSISTANT

## 2019-11-06 PROCEDURE — 99282 EMERGENCY DEPT VISIT SF MDM: CPT

## 2019-11-06 PROCEDURE — 64450 NJX AA&/STRD OTHER PN/BRANCH: CPT | Performed by: PHYSICIAN ASSISTANT

## 2019-11-06 RX ORDER — BUPIVACAINE HYDROCHLORIDE 5 MG/ML
5 INJECTION, SOLUTION EPIDURAL; INTRACAUDAL ONCE
Status: COMPLETED | OUTPATIENT
Start: 2019-11-06 | End: 2019-11-06

## 2019-11-06 RX ORDER — PENICILLIN V POTASSIUM 500 MG/1
500 TABLET ORAL 3 TIMES DAILY
Qty: 28 TABLET | Refills: 0 | Status: SHIPPED | OUTPATIENT
Start: 2019-11-06 | End: 2019-11-13

## 2019-11-06 RX ORDER — PENICILLIN V POTASSIUM 250 MG/1
500 TABLET ORAL ONCE
Status: COMPLETED | OUTPATIENT
Start: 2019-11-06 | End: 2019-11-06

## 2019-11-06 RX ORDER — LIDOCAINE HYDROCHLORIDE 10 MG/ML
6 INJECTION, SOLUTION EPIDURAL; INFILTRATION; INTRACAUDAL; PERINEURAL ONCE
Status: COMPLETED | OUTPATIENT
Start: 2019-11-06 | End: 2019-11-06

## 2019-11-06 RX ADMIN — BUPIVACAINE HYDROCHLORIDE 5 ML: 5 INJECTION, SOLUTION EPIDURAL; INTRACAUDAL at 03:54

## 2019-11-06 RX ADMIN — PENICILLIN V POTASSIUM 500 MG: 250 TABLET, FILM COATED ORAL at 03:53

## 2019-11-06 RX ADMIN — BENZOCAINE 1 APPLICATION: 220 GEL, DENTIFRICE DENTAL at 03:54

## 2019-11-06 RX ADMIN — LIDOCAINE HYDROCHLORIDE 6 ML: 10 INJECTION, SOLUTION EPIDURAL; INFILTRATION; INTRACAUDAL; PERINEURAL at 03:54

## 2019-11-07 NOTE — ED PROVIDER NOTES
Pt Name: Brook Doan  MRN: [id number]  YOB: 1990  Age/Sex: 33 y o  male  Date of evaluation: 11/6/2019  PCP: No primary care provider on file  CHIEF COMPLAINT    Chief Complaint   Patient presents with    Dental Pain     pt  comes in c/o dental pain on the top right side of his mouth  No bleeding  Patient states he has had cracked tooth on that side for awhile but has no insurance for a dentist          RAMESH Dwyer presents to the Emergency Department complaining of Dental Pain  History provided by:  Patient   used: No    Dental Pain   Location:  Upper  Upper teeth location: multiple right upper  Quality:  Throbbing  Severity:  Severe  Onset quality:  Gradual  Timing:  Constant  Progression:  Worsening  Chronicity: acute on chronic  Context: dental caries, dental fracture (chronic) and trauma    Context: not recent dental surgery    Relieved by:  Nothing  Worsened by:  Touching  Ineffective treatments:  NSAIDs  Associated symptoms: no congestion, no difficulty swallowing, no drooling, no facial pain, no facial swelling, no fever, no gum swelling, no headaches, no neck pain, no neck swelling, no oral bleeding, no oral lesions and no trismus    Risk factors: lack of dental care (no insurance), periodontal disease and smoking    Risk factors: no alcohol problem, no cancer, no chewing tobacco use, no diabetes and no immunosuppression          Past Medical and Surgical History    History reviewed  No pertinent past medical history      Past Surgical History:   Procedure Laterality Date    INCISION AND DRAINAGE INTRA ORAL ABSCESS Right 5/27/2019    Procedure: INCISION AND DRAINAGE  (I&D) R CANINE SPACE;  Surgeon: Mark Bliss DMD;  [facility];  Service: Maxillofacial    NH IMPACT TOOTH REMOV COMP BONY N/A 5/27/2019    Procedure: EXTRACTION TEETH #1,2,7,8,10,15,16,17;  Surgeon: Mark Bliss DMD;  [facility];  Service: Maxillofacial History reviewed  No pertinent family history  Social History     Tobacco Use    Smoking status: Current Every Day Smoker     Packs/day: 1 00    Smokeless tobacco: Current User   Substance Use Topics    Alcohol use: Yes    Drug use: Yes     Types: Marijuana       Allergies    No Known Allergies    Home Medications:    Prior to Admission medications    Medication Sig Start Date End Date Taking? Authorizing Provider   benzocaine (HURRICAINE) 20 % 1 application by Mucosal route 4 (four) times a day as needed for mucositis for up to 7 days 11/6/19 11/13/19  Breana Diaz PA-C   nicotine (NICODERM CQ) 21 mg/24 hr TD 24 hr patch Place 1 patch on the skin daily  Patient not taking: Reported on 9/2/2019 5/30/19   Tammy Chappell MD   penicillin V potassium (VEETID) 500 mg tablet Take 1 tablet (500 mg total) by mouth 3 (three) times a day for 7 days 11/6/19 11/13/19  Breana Diaz PA-C           Review of Systems    Review of Systems   Constitutional: Negative for activity change, appetite change, chills, diaphoresis, fatigue and fever  HENT: Positive for dental problem  Negative for congestion, drooling, facial swelling and mouth sores  Respiratory: Negative for cough and shortness of breath  Cardiovascular: Negative for chest pain  Gastrointestinal: Negative for abdominal pain  Musculoskeletal: Negative for neck pain  Skin: Negative for rash  Neurological: Negative for headaches  All other systems reviewed and are negative  All other systems reviewed and negative  Physical Exam      ED Triage Vitals [11/06/19 0341]   Temperature Pulse Respirations Blood Pressure SpO2   98 7 °F (37 1 °C) 56 14 146/85 100 %      Temp Source Heart Rate Source Patient Position - Orthostatic VS BP Location FiO2 (%)   Oral Monitor Lying Right arm --      Pain Score       --               Physical Exam   Constitutional: He is oriented to person, place, and time   He appears well-developed and well-nourished  No distress  HENT:   Head: Normocephalic and atraumatic  Mouth/Throat: Oropharynx is clear and moist and mucous membranes are normal  No trismus in the jaw  Abnormal dentition (s/p extractions, chronic fractures per patient as indicated on diagram)  Dental caries present  No dental abscesses or uvula swelling  Eyes: Pupils are equal, round, and reactive to light  Conjunctivae and EOM are normal    Neck: Normal range of motion  Neck supple  Cardiovascular: Normal rate, regular rhythm, normal heart sounds and intact distal pulses  Exam reveals no gallop and no friction rub  No murmur heard  Pulmonary/Chest: Effort normal and breath sounds normal  No stridor  No respiratory distress  He has no wheezes  He has no rales  He exhibits no tenderness  Musculoskeletal: Normal range of motion  Neurological: He is alert and oriented to person, place, and time  Skin: Skin is warm  Capillary refill takes less than 2 seconds  He is not diaphoretic  Nursing note and vitals reviewed  Diagnostic Results    ECG      Labs: All labs reviewed and utilized in the medical decision making process    Radiology:    No orders to display       All radiology studies independently viewed by me and interpreted by the radiologist     Procedure    Nerve block  Date/Time: 11/6/2019 4:47 AM  Performed by: Lynsey Trejo PA-C  Authorized by: Lynsey Trejo PA-C     Patient location:  ED  Other Assisting Provider: No    Consent:     Consent obtained:  Verbal    Consent given by:  Patient    Risks discussed:   Allergic reaction, bleeding, infection, intravenous injection, nerve damage, pain, unsuccessful block and swelling    Alternatives discussed:  No treatment, delayed treatment, alternative treatment and referral  Universal protocol:     Procedure explained and questions answered to patient or proxy's satisfaction: yes      Patient identity confirmed:  Verbally with patient, arm band and hospital-assigned identification number  Indications:     Indications:  Pain relief  Location:     Body area:  Head    Head nerve blocked: infraorbital     Nerve type:  Peripheral    Laterality:  Right  Pre-procedure details:     Skin preparation:  Povidone-iodine    Preparation: Patient was prepped and draped in usual sterile fashion    Skin anesthesia (see MAR for exact dosages):     Skin anesthesia method:  Topical application    Topical anesthetic:  Benzocaine gel  Procedure details (see MAR for exact dosages): Block needle gauge:  25 G    Anesthetic injected:  Bupivacaine 0 5% w/o epi and lidocaine 1% w/o epi    Steroid injected:  None    Additive injected:  None    Injection procedure:  Anatomic landmarks identified, anatomic landmarks palpated, incremental injection, introduced needle and negative aspiration for blood  Post-procedure details:     Dressing:  None    Outcome:  Pain improved    Patient tolerance of procedure: Tolerated well, no immediate complications          Assessment and Plan    MDM  Number of Diagnoses or Management Options  Dental caries: established, worsening  Dental injury: established, worsening  Dentalgia: new, no workup     Amount and/or Complexity of Data Reviewed  Review and summarize past medical records: yes    Risk of Complications, Morbidity, and/or Mortality  Presenting problems: moderate  Diagnostic procedures: moderate  Management options: moderate    Patient Progress  Patient progress: improved      Initial ED assessment:  Claudell Alamin  is a 33 y o  male with significant PMH for dental carries, poor dentition, dental abscess, tobacco dependence who presents with Dental Pain  Vitals signs reviewed and WNL  Physical examination remarkable for dental fractures R upper with enamel exposure, carries, no discernible abscess, facial swelling, no trismus      Initial Ddx  includes but is not limited to:   dental sagar, dental infection, dental abscess, dry socket, gingivitis; doubt sera's angina  Initial ED plan:   Plan will be to perform and treat symptomatically with dental block  Final ED summary/disposition: Discussed referral and need to f/u  Home care recommendations given with discharge paperwork  Return to ED instructions given if new/worsening sxs  MDM  Reviewed: previous chart, nursing note and vitals        ED Course of Care and Re-Assessments                            Medications   lidocaine (PF) (XYLOCAINE-MPF) 1 % injection 6 mL (6 mL Infiltration Given 11/6/19 0354)   bupivacaine (PF) (MARCAINE) 0 5 % injection 5 mL (5 mL Injection Given 11/6/19 0354)   penicillin V potassium (VEETID) tablet 500 mg (500 mg Oral Given 11/6/19 0353)   BENZOCAINE (DENTAL) 20 % swab 1 application (1 application Oral Given 23/0/70 0354)         FINAL IMPRESSION    Final diagnoses:   Dental injury - chronic   Dentalgia   Dental caries         DISPOSITION/PLAN  Time reflects when diagnosis was documented in both MDM as applicable and the Disposition within this note     Time User Action Codes Description Comment    11/6/2019  3:47 AM Artur Begin Add [S09 93XA] Dental injury     11/6/2019  3:47 AM Artur Begin Modify [S09 93XA] Dental injury chronic    11/6/2019  3:47 AM Artur Begin Add [K08 89] Pain, dental     11/6/2019  3:47 AM Artur Begin Remove [K08 89] Pain, dental     11/6/2019  3:47 AM [place] Begin Add [K08 89] Baldomero Hernandez     11/6/2019  3:47 AM Artur Begin Add [K02 9] Dental caries       ED Disposition     ED Disposition Condition Date/Time Comment    Discharge Stable Wed Nov 6, 2019  3:47 AM Claudell Alamin  discharge to home/self care              Follow-up Information     Follow up With Specialties Details Why Contact Info Additional [address] Emergency Department Emergency Medicine Go to  If symptoms worsen [address] [facility] [address] [facility] ED, [address] [place], [address]    [employer]  Call  to establish primary care, For follow up [address] Internal Medicine Call  For follow up [address] [address]  [address] [facility] Internal Medicine OS, [address], [place], [place], [address] Adult and [address]  Call  For follow up [address]  [phone]               PATIENT REFERRED TO:    [facility] Emergency Department  [address]  [phone]  Go to   If symptoms worsen    [employer]  [phone]    Call   to establish primary care, For follow up    [facility] Internal Medicine [address] [facility]  [phone]  Call   For follow up    [address] Adult and [address]  [address]  [phone]  Call   For follow up      DISCHARGE MEDICATIONS:    Discharge Medication List as of 11/6/2019  3:49 AM      START taking these medications    Details   benzocaine (HURRICAINE) 20 % 1 application by Mucosal route 4 (four) times a day as needed for mucositis for up to 7 days, Starting Wed 11/6/2019, Until Wed 11/13/2019, Print      penicillin V potassium (VEETID) 500 mg tablet Take 1 tablet (500 mg total) by mouth 3 (three) times a day for 7 days, Starting Wed 11/6/2019, Until Wed 11/13/2019, Print         CONTINUE these medications which have NOT CHANGED    Details   nicotine (NICODERM CQ) 21 mg/24 hr TD 24 hr patch Place 1 patch on the skin daily, Starting u 5/30/2019, Print             No discharge procedures on file           ABISAI Merchant PA-C  11/07/19 24 [facility] ABISAI Baker  11/07/19 0603

## 2019-12-28 ENCOUNTER — HOSPITAL ENCOUNTER (EMERGENCY)
Facility: HOSPITAL | Age: 29
Discharge: HOME | End: 2019-12-28
Attending: EMERGENCY MEDICINE

## 2019-12-28 VITALS
WEIGHT: 210 LBS | BODY MASS INDEX: 30.13 KG/M2 | TEMPERATURE: 97.6 F | SYSTOLIC BLOOD PRESSURE: 120 MMHG | DIASTOLIC BLOOD PRESSURE: 80 MMHG | HEART RATE: 60 BPM | OXYGEN SATURATION: 96 % | RESPIRATION RATE: 16 BRPM

## 2019-12-28 DIAGNOSIS — K08.89 PAIN, DENTAL: Primary | ICD-10-CM

## 2019-12-28 PROCEDURE — 99284 EMERGENCY DEPT VISIT MOD MDM: CPT | Performed by: PHYSICIAN ASSISTANT

## 2019-12-28 PROCEDURE — 99282 EMERGENCY DEPT VISIT SF MDM: CPT

## 2019-12-28 RX ORDER — PENICILLIN V POTASSIUM 500 MG/1
500 TABLET ORAL 4 TIMES DAILY
Qty: 40 TABLET | Refills: 0 | Status: SHIPPED | OUTPATIENT
Start: 2019-12-28 | End: 2020-01-07

## 2019-12-28 RX ORDER — IBUPROFEN 800 MG/1
800 TABLET ORAL EVERY 6 HOURS PRN
Qty: 30 TABLET | Refills: 0 | Status: SHIPPED | OUTPATIENT
Start: 2019-12-28 | End: 2020-01-07

## 2019-12-29 NOTE — ED PROVIDER NOTES
History  Chief Complaint   Patient presents with    Dental Pain     bilateral upper tooth pain  pt is on antibiotics for the same  was seen here  unable to get appointment with dental clinic     60-year-old male presents to the emergency department with complaints of dental pain  States that he has had right and left upper dental pain over the past 2 days  History of numerous fractured and carious teeth  States he was seen previously in emergency department the past year and had 6 teeth pulled  Does not follow the dentist on a routine basis  Denies any new fractures to these teeth  No facial swelling  No fevers  History provided by:  Patient   used: No    Dental Pain   Location:  Upper  Quality:  Aching and throbbing  Severity:  Moderate  Onset quality:  Gradual  Duration:  2 days  Timing:  Constant  Progression:  Worsening  Chronicity:  Recurrent  Context: dental caries, dental fracture and poor dentition    Relieved by:  Nothing  Ineffective treatments:  None tried  Associated symptoms: gum swelling    Associated symptoms: no drooling, no facial pain, no facial swelling and no fever        Prior to Admission Medications   Prescriptions Last Dose Informant Patient Reported? Taking?   nicotine (NICODERM CQ) 21 mg/24 hr TD 24 hr patch   No No   Sig: Place 1 patch on the skin daily   Patient not taking: Reported on 9/2/2019      Facility-Administered Medications: None       History reviewed  No pertinent past medical history  Past Surgical History:   Procedure Laterality Date    INCISION AND DRAINAGE INTRA ORAL ABSCESS Right 5/27/2019    Procedure: INCISION AND DRAINAGE  (I&D) R CANINE SPACE;  Surgeon: Oliverio Penn DMD;  [facility];  Service: Maxillofacial    SC IMPACT TOOTH REMOV COMP BONY N/A 5/27/2019    Procedure: EXTRACTION TEETH #1,2,7,8,10,15,16,17;  Surgeon: Oliverio Penn DMD;  [facility];  Service: Maxillofacial       History reviewed   No pertinent family history  I have reviewed and agree with the history as documented  Social History     Tobacco Use    Smoking status: Current Every Day Smoker     Packs/day: 1 00    Smokeless tobacco: Current User   Substance Use Topics    Alcohol use: Yes    Drug use: Yes     Types: Marijuana        Review of Systems   Constitutional: Negative for chills and fever  HENT: Positive for dental problem  Negative for drooling, ear pain, facial swelling, sinus pressure, sneezing, sore throat and trouble swallowing  Eyes: Negative for pain  Respiratory: Negative for cough and chest tightness  Cardiovascular: Negative for chest pain  Skin: Negative for wound  All other systems reviewed and are negative  Physical Exam  Physical Exam   Constitutional: He is oriented to person, place, and time  Vital signs are normal  He appears well-developed and well-nourished  HENT:   Head: Normocephalic and atraumatic  Right Ear: Hearing, tympanic membrane, external ear and ear canal normal    Left Ear: Hearing, tympanic membrane, external ear and ear canal normal    Nose: Nose normal    Mouth/Throat: Uvula is midline and oropharynx is clear and moist  Dental caries present  No dental abscesses  For dentition with numerous carious and fractured teeth  Poor dentition precludes numbering of the right and left upper teeth but numerous teeth do appear fractured with localized swelling along the gum line  No overlying facial swelling  Neck: Trachea normal and normal range of motion  Cardiovascular: Normal rate and regular rhythm  Exam reveals no gallop and no friction rub  No murmur heard  Pulmonary/Chest: Effort normal and breath sounds normal  No respiratory distress  He has no wheezes  He has no rales  Musculoskeletal: Normal range of motion  Neurological: He is alert and oriented to person, place, and time  Skin: Skin is warm and dry  Psychiatric: He has a normal mood and affect   His behavior is normal    Vitals reviewed  Vital Signs  ED Triage Vitals [12/28/19 1131]   Temperature Pulse Respirations Blood Pressure SpO2   97 6 °F (36 4 °C) 60 16 120/80 96 %      Temp Source Heart Rate Source Patient Position - Orthostatic VS BP Location FiO2 (%)   Oral Monitor -- Left arm --      Pain Score       4           Vitals:    12/28/19 1131   BP: 120/80   Pulse: 60         Visual Acuity      ED Medications  Medications - No data to display    Diagnostic Studies  Results Reviewed     None                 No orders to display              Procedures  Procedures         ED Course                               MDM  Number of Diagnoses or Management Options  Pain, dental:   Diagnosis management comments: Differential diagnosis includes but not limited to:  Dental pain, dental caries          Disposition  Final diagnoses:   Pain, dental     Time reflects when diagnosis was documented in both MDM as applicable and the Disposition within this note     Time User Action Codes Description Comment    12/28/2019 11:36 AM Pita Pagan Add [K08 89] Pain, dental       ED Disposition     ED Disposition Condition Date/Time Comment    Discharge Stable Sat Dec 28, 2019 11:36 AM Vishal Marshall  discharge to home/self care              Follow-up Information     Follow up With Specialties Details Marvel Mari  Schedule an appointment as soon as possible for a visit   [address]  [facility] [address] [phone]          Discharge Medication List as of 12/28/2019 11:37 AM      START taking these medications    Details   ibuprofen (MOTRIN) 800 mg tablet Take 1 tablet (800 mg total) by mouth every 6 (six) hours as needed for mild pain for up to 10 days, Starting Sat 12/28/2019, Until Tue 1/7/2020, Normal      penicillin V potassium (VEETID) 500 mg tablet Take 1 tablet (500 mg total) by mouth 4 (four) times a day for 10 days, Starting Sat 12/28/2019, Until Tue 1/7/2020, Normal         CONTINUE these medications which have NOT CHANGED    Details   nicotine (NICODERM CQ) 21 mg/24 hr TD 24 hr patch Place 1 patch on the skin daily, Starting Thu 5/30/2019, Print           No discharge procedures on file      ED Provider  Electronically Signed by           Philipp Frias PA-C  12/29/19 2583

## 2020-01-20 ENCOUNTER — HOSPITAL ENCOUNTER (EMERGENCY)
Facility: HOSPITAL | Age: 30
Discharge: NURSING, REHAB OR LONG-TERM CARE | End: 2020-01-28
Attending: EMERGENCY MEDICINE | Admitting: EMERGENCY MEDICINE

## 2020-01-20 ENCOUNTER — APPOINTMENT (EMERGENCY)
Dept: RADIOLOGY | Facility: HOSPITAL | Age: 30
End: 2020-01-20

## 2020-01-20 DIAGNOSIS — T50.901A OVERDOSE: Primary | ICD-10-CM

## 2020-01-20 DIAGNOSIS — T14.91XA SUICIDE ATTEMPT (HCC): ICD-10-CM

## 2020-01-20 LAB
ALBUMIN SERPL BCP-MCNC: 4.6 G/DL (ref 3.5–5)
ALP SERPL-CCNC: 97 U/L (ref 46–116)
ALT SERPL W P-5'-P-CCNC: 45 U/L (ref 12–78)
AMPHETAMINES SERPL QL SCN: NEGATIVE
ANION GAP SERPL CALCULATED.3IONS-SCNC: 9 MMOL/L (ref 4–13)
APAP SERPL-MCNC: <2 UG/ML (ref 10–20)
AST SERPL W P-5'-P-CCNC: 20 U/L (ref 5–45)
BARBITURATES UR QL: NEGATIVE
BASOPHILS # BLD AUTO: 0.07 THOUSANDS/ΜL (ref 0–0.1)
BASOPHILS NFR BLD AUTO: 1 % (ref 0–1)
BENZODIAZ UR QL: NEGATIVE
BILIRUB SERPL-MCNC: 0.5 MG/DL (ref 0.2–1)
BILIRUB UR QL STRIP: NEGATIVE
BUN SERPL-MCNC: 9 MG/DL (ref 5–25)
CALCIUM SERPL-MCNC: 9.1 MG/DL (ref 8.3–10.1)
CHLORIDE SERPL-SCNC: 102 MMOL/L (ref 100–108)
CLARITY UR: CLEAR
CO2 SERPL-SCNC: 28 MMOL/L (ref 21–32)
COCAINE UR QL: NEGATIVE
COLOR UR: NORMAL
CREAT SERPL-MCNC: 1.08 MG/DL (ref 0.6–1.3)
EOSINOPHIL # BLD AUTO: 0.06 THOUSAND/ΜL (ref 0–0.61)
EOSINOPHIL NFR BLD AUTO: 1 % (ref 0–6)
ERYTHROCYTE [DISTWIDTH] IN BLOOD BY AUTOMATED COUNT: 11.9 % (ref 11.6–15.1)
ETHANOL SERPL-MCNC: <3 MG/DL (ref 0–3)
GFR SERPL CREATININE-BSD FRML MDRD: 93 ML/MIN/1.73SQ M
GLUCOSE SERPL-MCNC: 103 MG/DL (ref 65–140)
GLUCOSE UR STRIP-MCNC: NEGATIVE MG/DL
HCT VFR BLD AUTO: 49.3 % (ref 36.5–49.3)
HGB BLD-MCNC: 17.6 G/DL (ref 12–17)
HGB UR QL STRIP.AUTO: NEGATIVE
IMM GRANULOCYTES # BLD AUTO: 0.05 THOUSAND/UL (ref 0–0.2)
IMM GRANULOCYTES NFR BLD AUTO: 1 % (ref 0–2)
KETONES UR STRIP-MCNC: NEGATIVE MG/DL
LEUKOCYTE ESTERASE UR QL STRIP: NEGATIVE
LYMPHOCYTES # BLD AUTO: 1.56 THOUSANDS/ΜL (ref 0.6–4.47)
LYMPHOCYTES NFR BLD AUTO: 14 % (ref 14–44)
MCH RBC QN AUTO: 32.6 PG (ref 26.8–34.3)
MCHC RBC AUTO-ENTMCNC: 35.7 G/DL (ref 31.4–37.4)
MCV RBC AUTO: 91 FL (ref 82–98)
METHADONE UR QL: NEGATIVE
MONOCYTES # BLD AUTO: 0.75 THOUSAND/ΜL (ref 0.17–1.22)
MONOCYTES NFR BLD AUTO: 7 % (ref 4–12)
NEUTROPHILS # BLD AUTO: 8.39 THOUSANDS/ΜL (ref 1.85–7.62)
NEUTS SEG NFR BLD AUTO: 76 % (ref 43–75)
NITRITE UR QL STRIP: NEGATIVE
NRBC BLD AUTO-RTO: 0 /100 WBCS
OPIATES UR QL SCN: NEGATIVE
PCP UR QL: NEGATIVE
PH UR STRIP.AUTO: 6.5 [PH]
PLATELET # BLD AUTO: 181 THOUSANDS/UL (ref 149–390)
PMV BLD AUTO: 12.4 FL (ref 8.9–12.7)
POTASSIUM SERPL-SCNC: 4.3 MMOL/L (ref 3.5–5.3)
PROT SERPL-MCNC: 7.6 G/DL (ref 6.4–8.2)
PROT UR STRIP-MCNC: NEGATIVE MG/DL
RBC # BLD AUTO: 5.4 MILLION/UL (ref 3.88–5.62)
SALICYLATES SERPL-MCNC: 3 MG/DL (ref 3–20)
SODIUM SERPL-SCNC: 139 MMOL/L (ref 136–145)
SP GR UR STRIP.AUTO: 1.02 (ref 1–1.03)
THC UR QL: POSITIVE
UROBILINOGEN UR QL STRIP.AUTO: 0.2 E.U./DL
WBC # BLD AUTO: 10.88 THOUSAND/UL (ref 4.31–10.16)

## 2020-01-20 PROCEDURE — 80329 ANALGESICS NON-OPIOID 1 OR 2: CPT | Performed by: EMERGENCY MEDICINE

## 2020-01-20 PROCEDURE — 80053 COMPREHEN METABOLIC PANEL: CPT | Performed by: EMERGENCY MEDICINE

## 2020-01-20 PROCEDURE — 71045 X-RAY EXAM CHEST 1 VIEW: CPT

## 2020-01-20 PROCEDURE — 99285 EMERGENCY DEPT VISIT HI MDM: CPT

## 2020-01-20 PROCEDURE — 80307 DRUG TEST PRSMV CHEM ANLYZR: CPT | Performed by: EMERGENCY MEDICINE

## 2020-01-20 PROCEDURE — 93005 ELECTROCARDIOGRAM TRACING: CPT

## 2020-01-20 PROCEDURE — 85025 COMPLETE CBC W/AUTO DIFF WBC: CPT | Performed by: EMERGENCY MEDICINE

## 2020-01-20 PROCEDURE — 80320 DRUG SCREEN QUANTALCOHOLS: CPT | Performed by: EMERGENCY MEDICINE

## 2020-01-20 PROCEDURE — 99285 EMERGENCY DEPT VISIT HI MDM: CPT | Performed by: EMERGENCY MEDICINE

## 2020-01-20 PROCEDURE — 96360 HYDRATION IV INFUSION INIT: CPT

## 2020-01-20 PROCEDURE — 99449 NTRPROF PH1/NTRNET/EHR 31/>: CPT | Performed by: EMERGENCY MEDICINE

## 2020-01-20 PROCEDURE — 81003 URINALYSIS AUTO W/O SCOPE: CPT | Performed by: EMERGENCY MEDICINE

## 2020-01-20 PROCEDURE — 36415 COLL VENOUS BLD VENIPUNCTURE: CPT | Performed by: EMERGENCY MEDICINE

## 2020-01-20 RX ORDER — ACTIVATED CHARCOAL 208 MG/ML
25 SUSPENSION ORAL ONCE
Status: COMPLETED | OUTPATIENT
Start: 2020-01-20 | End: 2020-01-20

## 2020-01-20 RX ORDER — ACETAMINOPHEN 325 MG/1
650 TABLET ORAL ONCE
Status: COMPLETED | OUTPATIENT
Start: 2020-01-20 | End: 2020-01-20

## 2020-01-20 RX ADMIN — SODIUM CHLORIDE 1000 ML: 0.9 INJECTION, SOLUTION INTRAVENOUS at 18:50

## 2020-01-20 RX ADMIN — ACETAMINOPHEN 650 MG: 325 TABLET, FILM COATED ORAL at 23:36

## 2020-01-20 RX ADMIN — ACTIVATED CHARCOAL 25 G: 208 SUSPENSION ORAL at 18:43

## 2020-01-20 NOTE — ED PROVIDER NOTES
History  Chief Complaint   Patient presents with    Overdose - Intentional     states about an hour ago drank some beers and some rum and about 20 advil liquigels to end his life   Chest Pain     chest pain for a couple of days     HPI    This is a 67-year-old male that presents today with intentional overdose  Patient states about an hour ago he drank some beer and ROM along with took 20 Advils that are to 200 mg  Patient states this was a suicide attempt  Patient states he has been having some chest pain as well  States he has been smoking marijuana  Denies any dizziness  Pain  30 year male that presents with intentional overdose  Patient will require to be committed  Will need to be medically clear will give him charcoal since patient is within the 2 hour window  Will get lab work to rule out any acute renal failure other metabolic disorders  Patient is medically cleared  Ran case by Toxicology     Prior to Admission Medications   Prescriptions Last Dose Informant Patient Reported? Taking?   ibuprofen (MOTRIN) 800 mg tablet   No No   Sig: Take 1 tablet (800 mg total) by mouth every 6 (six) hours as needed for mild pain for up to 10 days   nicotine (NICODERM CQ) 21 mg/24 hr TD 24 hr patch Not Taking at Unknown time  No No   Sig: Place 1 patch on the skin daily   Patient not taking: Reported on 9/2/2019      Facility-Administered Medications: None       History reviewed  No pertinent past medical history  Past Surgical History:   Procedure Laterality Date    INCISION AND DRAINAGE INTRA ORAL ABSCESS Right 5/27/2019    Procedure: INCISION AND DRAINAGE  (I&D) R CANINE SPACE;  Surgeon: Izabela Shepard DMD;  [facility];  Service: Maxillofacial    PA IMPACT TOOTH REMOV COMP BONY N/A 5/27/2019    Procedure: EXTRACTION TEETH #1,2,7,8,10,15,16,17;  Surgeon: Izabela Shepard DMD;  [facility];  Service: Maxillofacial       History reviewed  No pertinent family history    I have reviewed and agree with the history as documented  Social History     Tobacco Use    Smoking status: Current Every Day Smoker     Packs/day: 1 00    Smokeless tobacco: Former User   Substance Use Topics    Alcohol use: Yes     Comment: socially    Drug use: Yes     Types: Marijuana        Review of Systems   Constitutional: Negative  Negative for diaphoresis and fever  HENT: Negative  Respiratory: Negative  Negative for cough, shortness of breath and wheezing  Cardiovascular: Positive for chest pain  Negative for palpitations and leg swelling  Gastrointestinal: Negative for abdominal distention, abdominal pain, nausea and vomiting  Genitourinary: Negative  Musculoskeletal: Negative  Skin: Negative  Neurological: Negative  Psychiatric/Behavioral: Negative  All other systems reviewed and are negative  Physical Exam  Physical Exam   Constitutional: He is oriented to person, place, and time  He appears well-developed and well-nourished  No distress  HENT:   Head: Normocephalic and atraumatic  Nose: Nose normal    Mouth/Throat: Oropharynx is clear and moist    Eyes: Pupils are equal, round, and reactive to light  Conjunctivae and EOM are normal    Neck: Normal range of motion  Neck supple  Cardiovascular: Normal rate, regular rhythm and normal heart sounds  No murmur heard  Pulmonary/Chest: Effort normal and breath sounds normal  No respiratory distress  He has no wheezes  He has no rales  Abdominal: Soft  Bowel sounds are normal  He exhibits no distension  There is no tenderness  There is no rebound and no guarding  Musculoskeletal: Normal range of motion  He exhibits no edema, tenderness or deformity  Neurological: He is alert and oriented to person, place, and time  No cranial nerve deficit  Skin: Skin is warm and dry  No rash noted  He is not diaphoretic  No pallor  Psychiatric:   depressed   Vitals reviewed        Vital Signs  ED Triage Vitals [01/20/20 1821]   Temperature Pulse Respirations Blood Pressure SpO2   99 °F (37 2 °C) 87 16 153/95 97 %      Temp Source Heart Rate Source Patient Position - Orthostatic VS BP Location FiO2 (%)   Tympanic Monitor Sitting Right arm --      Pain Score       3           Vitals:    01/20/20 1821 01/20/20 1900 01/20/20 1915 01/20/20 1930   BP: 153/95 166/77  136/80   Pulse: 87 56 78 80   Patient Position - Orthostatic VS: Sitting            Visual Acuity      ED Medications  Medications   sodium chloride 0 9 % bolus 1,000 mL (1,000 mL Intravenous New Bag 1/20/20 1850)   activated charcoal (ACTIDOSE WITH SORBITOL) in sorbitol suspension 25 g (25 g Oral Given 1/20/20 1843)       Diagnostic Studies  Results Reviewed     Procedure Component Value Units Date/Time    Acetaminophen level-If concentration is detectable, please discuss with medical  on call  [[id number]]  (Abnormal) Collected:  01/20/20 1849    Lab Status:  Final result Specimen:  Blood from Arm, Right Updated:  01/20/20 1923     Acetaminophen Level <2 0 ug/mL     Rapid drug screen, urine [[id number]]  (Abnormal) Collected:  01/20/20 1857    Lab Status:  Final result Specimen:  Urine, Clean Catch Updated:  01/20/20 1920     Amph/Meth UR Negative     Barbiturate Ur Negative     Benzodiazepine Urine Negative     Cocaine Urine Negative     Methadone Urine Negative     Opiate Urine Negative     PCP Ur Negative     THC Urine Positive    Narrative:       Presumptive report  If requested, specimen will be sent to reference lab for confirmation  FOR MEDICAL PURPOSES ONLY  IF CONFIRMATION NEEDED PLEASE CONTACT THE LAB WITHIN 5 DAYS      Drug Screen Cutoff Levels:  AMPHETAMINE/METHAMPHETAMINES  1000 ng/mL  BARBITURATES     200 ng/mL  BENZODIAZEPINES     200 ng/mL  COCAINE      300 ng/mL  METHADONE      300 ng/mL  OPIATES      300 ng/mL  PHENCYCLIDINE     25 ng/mL  THC       50 ng/mL      Comprehensive metabolic panel [[id number]] Collected:  01/20/20 1849    Lab Status: Final result Specimen:  Blood from Arm, Right Updated:  01/20/20 1912     Sodium 139 mmol/L      Potassium 4 3 mmol/L      Chloride 102 mmol/L      CO2 28 mmol/L      ANION GAP 9 mmol/L      BUN 9 mg/dL      Creatinine 1 08 mg/dL      Glucose 103 mg/dL      Calcium 9 1 mg/dL      AST 20 U/L      ALT 45 U/L      Alkaline Phosphatase 97 U/L      Total Protein 7 6 g/dL      Albumin 4 6 g/dL      Total Bilirubin 0 50 mg/dL      eGFR 93 ml/min/1 73sq m     Narrative:       National Kidney Disease Foundation guidelines for Chronic Kidney Disease (CKD):     Stage 1 with normal or high GFR (GFR > 90 mL/min/1 73 square meters)    Stage 2 Mild CKD (GFR = 60-89 mL/min/1 73 square meters)    Stage 3A Moderate CKD (GFR = 45-59 mL/min/1 73 square meters)    Stage 3B Moderate CKD (GFR = 30-44 mL/min/1 73 square meters)    Stage 4 Severe CKD (GFR = 15-29 mL/min/1 73 square meters)    Stage 5 End Stage CKD (GFR <15 mL/min/1 73 square meters)  Note: GFR calculation is accurate only with a steady state creatinine    Salicylate level [[id number]]  (Normal) Collected:  01/20/20 1849    Lab Status:  Final result Specimen:  Blood from Arm, Right Updated:  96/21/38 1844     Salicylate Lvl 3 0 mg/dL     Ethanol [[id number]]  (Normal) Collected:  01/20/20 1849    Lab Status:  Final result Specimen:  Blood from Arm, Right Updated:  01/20/20 1912     Ethanol Lvl <3 mg/dL     UA (URINE) with reflex to Scope [[id number]] Collected:  01/20/20 1857    Lab Status:  Final result Specimen:  Urine, Clean Catch Updated:  01/20/20 1906     Color, UA Light Yellow     Clarity, UA Clear     Specific [place], UA 1 020     pH, UA 6 5     Leukocytes, UA Negative     Nitrite, UA Negative     Protein, UA Negative mg/dl      Glucose, UA Negative mg/dl      Ketones, UA Negative mg/dl      Urobilinogen, UA 0 2 E U /dl      Bilirubin, UA Negative     Blood, UA Negative    CBC and differential [[id number]]  (Abnormal) Collected:  01/20/20 1849    Lab Status: Final result Specimen:  Blood from Arm, Right Updated:  01/20/20 1855     WBC 10 88 Thousand/uL      RBC 5 40 Million/uL      Hemoglobin 17 6 g/dL      Hematocrit 49 3 %      MCV 91 fL      MCH 32 6 pg      MCHC 35 7 g/dL      RDW 11 9 %      MPV 12 4 fL      Platelets 058 Thousands/uL      nRBC 0 /100 WBCs      Neutrophils Relative 76 %      Immat GRANS % 1 %      Lymphocytes Relative 14 %      Monocytes Relative 7 %      Eosinophils Relative 1 %      Basophils Relative 1 %      Neutrophils Absolute 8 39 Thousands/µL      Immature Grans Absolute 0 05 Thousand/uL      Lymphocytes Absolute 1 56 Thousands/µL      Monocytes Absolute 0 75 Thousand/µL      Eosinophils Absolute 0 06 Thousand/µL      Basophils Absolute 0 07 Thousands/µL                  XR chest 1 view portable    (Results Pending)              Procedures  Procedures         ED Course  ED Course as of Jan 20 1949   Mon Jan 20, 2020   4479 Procedure Note: EKG  Date/Time: 01/20/20 6:52 PM   Performed by: Janiya Dumont   Authorized by: Janiya Dumont   Indications / Diagnosis: CP  ECG reviewed by me, the ED Provider: yes   The EKG demonstrates:  Rhythm: normal sinus  Intervals: normal intervals  Axis: normal axis  QRS/Blocks: normal QRS  ST Changes: No acute ST Changes, no STD/DIALLO  RBBB  Unchnaged from prior      46 Spoke with Toxicology who recommended clear after labwork                                  MDM      Disposition  Final diagnoses:   None     ED Disposition     None      Follow-up Information    None         Patient's Medications   Discharge Prescriptions    No medications on file     No discharge procedures on file      ED Provider  Electronically Signed by           Dayna Espinosa MD  01/23/20 2052

## 2020-01-21 LAB
ATRIAL RATE: 83 BPM
P AXIS: 39 DEGREES
PR INTERVAL: 154 MS
QRS AXIS: 52 DEGREES
QRSD INTERVAL: 136 MS
QT INTERVAL: 390 MS
QTC INTERVAL: 458 MS
T WAVE AXIS: 23 DEGREES
VENTRICULAR RATE: 83 BPM

## 2020-01-21 PROCEDURE — 93010 ELECTROCARDIOGRAM REPORT: CPT | Performed by: INTERNAL MEDICINE

## 2020-01-21 RX ORDER — NICOTINE 21 MG/24HR
21 PATCH, TRANSDERMAL 24 HOURS TRANSDERMAL ONCE
Status: COMPLETED | OUTPATIENT
Start: 2020-01-21 | End: 2020-01-22

## 2020-01-21 RX ADMIN — NICOTINE 21 MG: 21 PATCH, EXTENDED RELEASE TRANSDERMAL at 16:45

## 2020-01-21 NOTE — ED NOTES
Patient standing up and asking for his stuff to go home  I explained that he couldn't have his stuff or leave because he was committed and that we would need to call security if he didn't calm down  He pulled off his wrist band and said fucking call them then  Then he just laid down and covered up with a blanket        Fauzia Ramos  01/21/20 1534       Fauzia Ramos  01/21/20 1537

## 2020-01-21 NOTE — ED NOTES
Patient ate lunch and drank soda  Then asked for something for tooth pain or to go home whichever  RN made aware       Fauzia Ramos  01/21/20 4463

## 2020-01-21 NOTE — CONSULTS
PHONE [facility] 1979 Toxicology  Hettie Najjar  30 y o  male MRN: [id number]  Unit/Bed#: ED CT1 Encounter: [id number]      Reason for Consult / Principal Problem: Overdose  Consults  01/20/20      ASSESSMENT:  1) NSAID overdose    DISCUSSION/ RECOMMENDATIONS:  The patient presented after intentional ibuprofen overdose earlier this evening  Per my discussion with treating emergency physician he is doing overall well with no gastrointestinal symptoms, and with normal vital signs and exam   The patient reported ingestion of approximately 20 X 200 milligram tablets of ibuprofen  Ibuprofen is an NSAID which in overdose can cause gastrointestinal symptoms, acute kidney injury, and had massive overdose other findings including metabolic acidosis  At least 200 milligrams/kilogram is needed to cause any significant toxicity, and generally severe toxicity is not seen until ingestion of over 400 milligrams/kilogram   Given that the patient ingested well under this amount and is clinically well I do not have further concerns for significant toxicity developing  Lab results reviewed  The patient is medically cleared from toxicology perspective for Behavioral Health at this time  No further labs or medical treatments are needed  For further questions, please call [facility]  Service or Patient Access Center to reach the medical  on call  Hx and PE limited by the dynamics of a phone consultation  I have not personally interviewed or evaluated the patient, but only advised based on the information provided to me  Primary provider is responsible for all clinical decisions  Pertinent history, physical exam and clinical findings and course discussed: Hettie Najjar  is a 30y o  year old male who presents with ibuprofen overdose    Per my discussion with treating emergency physician the patient ingested about 20 X 200 mg ibuprofen tablets about an hour before arrival   He denied any other ingestions  He did receive activated charcoal on arrival to the emergency department  He is noted to be overall well with no gastrointestinal symptoms, and with normal vital signs and exam     Review of systems and physical exam not performed by me  Historical Information   History reviewed  No pertinent past medical history  Past Surgical History:   Procedure Laterality Date    INCISION AND DRAINAGE INTRA ORAL ABSCESS Right 5/27/2019    Procedure: INCISION AND DRAINAGE  (I&D) R CANINE SPACE;  Surgeon: Luis Antonio Lee DMD;  [facility];  Service: Maxillofacial    DC IMPACT TOOTH REMOV COMP BONY N/A 5/27/2019    Procedure: EXTRACTION TEETH #1,2,7,8,10,15,16,17;  Surgeon: Luis Antonio Lee DMD;  [facility];  Service: Maxillofacial     Social History   Social History     Substance and Sexual Activity   Alcohol Use Yes    Comment: socially     Social History     Substance and Sexual Activity   Drug Use Yes    Types: Marijuana     Social History     Tobacco Use   Smoking Status Current Every Day Smoker    Packs/day: 1 00   Smokeless Tobacco Former User     History reviewed  No pertinent family history  Prior to Admission medications    Medication Sig Start Date End Date Taking?  Authorizing Provider   ibuprofen (MOTRIN) 800 mg tablet Take 1 tablet (800 mg total) by mouth every 6 (six) hours as needed for mild pain for up to 10 days 12/28/19 1/7/20  Jose Pagan PA-C   nicotine (NICODERM CQ) 21 mg/24 hr TD 24 hr patch Place 1 patch on the skin daily  Patient not taking: Reported on 9/2/2019 5/30/19   Angely Moss MD       Current Facility-Administered Medications   Medication Dose Route Frequency    sodium chloride 0 9 % bolus 1,000 mL  1,000 mL Intravenous Once       No Known Allergies    Objective     No intake or output data in the 24 hours ending 01/20/20 1942    Invasive Devices:   Peripheral IV 01/20/20 Right Antecubital (Active)   Site Assessment Clean;Dry; Intact 1/20/2020  6:50 PM   Dressing Type Transparent 1/20/2020  6:50 PM   Line Status Blood return noted; Flushed;Saline locked 1/20/2020  6:50 PM   Dressing Status Clean;Dry; Intact 1/20/2020  6:50 PM       Vitals   Vitals:    01/20/20 1821 01/20/20 1900 01/20/20 1915   BP: 153/95 166/77    TempSrc: Tympanic     Pulse: 87 56 78   Resp: 16 20 18   Patient Position - Orthostatic VS: Sitting     Temp: 99 °F (37 2 °C)           EKG, Pathology, and/or Other Studies: Discussed with Dr Anna Welch      Lab Results: I have personally reviewed pertinent reports  Labs:  Results from last 7 days   Lab Units 01/20/20  1849   WBC Thousand/uL 10 88*   HEMOGLOBIN g/dL 17 6*   HEMATOCRIT % 49 3   PLATELETS Thousands/uL 181   NEUTROS PCT % 76*   LYMPHS PCT % 14   MONOS PCT % 7      Results from last 7 days   Lab Units 01/20/20  1849   POTASSIUM mmol/L 4 3   CHLORIDE mmol/L 102   CO2 mmol/L 28   BUN mg/dL 9   CREATININE mg/dL 1 08   CALCIUM mg/dL 9 1   ALK PHOS U/L 97   ALT U/L 45   AST U/L 20              No results found for: TROPONINI      Results from last 7 days   Lab Units 01/20/20  1849   ACETAMINOPHEN LVL ug/mL <2 0*   ETHANOL LVL mg/dL <3   SALICYLATE LVL mg/dL 3 0     Invalid input(s): EXTPREGUR        Counseling / Coordination of Care  Total time spent today 31 minutes  This was a phone consultation

## 2020-01-21 NOTE — ED NOTES
No [facility] beds available  No beds at [facility]  No beds at [facility]  [facility] 9 stated "we don't have any beds for an uninsured"  201 signed and faxed to [facility] @ 20:20 - originals placed in envelope on patient's chart; called and left [facility] left voice mail  Confirmed the 201 was received @ [facility]  Chart to be reviewed tomorrow after there have been some d/c's

## 2020-01-21 NOTE — ED NOTES
29 yo SWM brought to the ER by his sister after the patient intentionally ingested about "20 tabs of advil to end his life"  Stressors include: "I lost my son about 1 month ago (5 months old and resides with his mother); my GF broke up with me about 3 weeks ago and I left my job at that time; try not to think about how shitty my life is"; and homelessness x 10 years  Mood = "suicidal" - (PES asked for a better description) - patient responded "depressed for a long time"  Symptoms include: suicide attempt as above; "barely sleeping" - about 5-6 hours "when I do sleep"; "barely eating" with a reported 30 pound weight loss over 1 month; poor social supports; poor self esteem; not thinking clearly with racing thoughts which at times are also tangential; etoh use from age 22 - drank 3 beers and some blackberry wine today (etoh level was 0) - use is "not often"; cannabis use from age 13 - use is daily - last use today - reportedly use about 1/8th of an ounce daily  The patient denies: HI; psychosis; paranoia; mood swings; anxiety; hopelessness or anhedonia      Collateral, information was not collected as not thought to be clinically needed,

## 2020-01-21 NOTE — ED NOTES
Pt agitated and stating he wants to leave  Pt requesting cigarette  Pt advised that this is not possible and offered nicotine patch  Pt declined  Pt informed on status of stay and updated on procedure  Pt verbalized understanding  Pt again stated he wanted to smoke  Pt given options again and notified that this would not change  Pt requested nicotine patch and med for anxiety   made aware       Dagoberto Negrete, RN  01/21/20 9470

## 2020-01-21 NOTE — ED NOTES
FG called in and notified RN that she spoke to pt's mom  As per FG, family reported that he told family members that he took 50 pills of ibuprofen  Dr Addie Vigil made aware, no new order at this time        Desirae Jasmine RN  01/21/20 7830

## 2020-01-21 NOTE — ED NOTES
1/21/20 @ 0845:  PES informed that patient "wants to leave and sign out AMA "  PES met with patient and tried to help patient understand that he needed inpatient treatment, but he refused  PES explained process and that patient may get "committed," but he insisted on leaving, therefore, PES informed Nanette Miller from [facility] that patient will require screening for commitment  PES will continue to monitor  Jayesh Baig MS  0945: Zulma from family [facility] assessing patient for commitment  MS Rianna  1000: Zulma from [facility] completed screening; patient will be telepsyched later today    Jayesh Baig, MS

## 2020-01-21 NOTE — ED NOTES
Pt remains sleeping, respirations easy and unlabored    On continuous observation     Peter Bowser, RN  01/21/20 0665

## 2020-01-22 RX ORDER — NICOTINE 21 MG/24HR
21 PATCH, TRANSDERMAL 24 HOURS TRANSDERMAL DAILY
Status: DISCONTINUED | OUTPATIENT
Start: 2020-01-22 | End: 2020-01-29 | Stop reason: HOSPADM

## 2020-01-22 RX ADMIN — NICOTINE 21 MG: 21 PATCH, EXTENDED RELEASE TRANSDERMAL at 12:41

## 2020-01-22 NOTE — ED NOTES
[facility]       Cristela Calle RN  01/22/20 8813

## 2020-01-22 NOTE — ED NOTES
Pt is resting in bed, eye closed  1:1 meme, Louise PCA at bedside        Desirae Jasmine, RAFFI  01/22/20 5787

## 2020-01-22 NOTE — ED NOTES
Pt is finished shower, settled back in his room, eating lunch  1:1 continues        Cayden Kate RN  01/22/20 4381

## 2020-01-22 NOTE — ED NOTES
Pt was cooperative with moving to a new room, observation continues        Courtney Carmen RN  01/22/20 1117

## 2020-01-22 NOTE — ED NOTES
Pt requested to take a shower, Memo Henriquez will assist the patient to shower        Lupe Fuller RN  01/22/20 3722

## 2020-01-22 NOTE — ED NOTES
Pt is taking a shower at this time, [facility] tech assisting the patient         Mercedez Fraire RN  01/22/20 7584

## 2020-01-22 NOTE — ED NOTES
Received patient in room,asleep,no distress noted  Continuous observation for SI with sitter at bedside in progress       Janak Farrar RN  01/22/20 3872

## 2020-01-22 NOTE — ED NOTES
Called [facility] for an update @ 18:00 - still working on a bed search (limited due to no health care insurance)  [facility] Smith called about 20:00 - no beds but they will continue to look

## 2020-01-22 NOTE — ED NOTES
Pt resting quietly in the bed, respirations easy and unlabored        Pete De Santiago RN  01/22/20 9170

## 2020-01-22 NOTE — ED NOTES
Pt continues to rest, continuous observation in place and being documented via paper        Blanca Li RN  01/22/20 0720

## 2020-01-22 NOTE — ED NOTES
Patient is awake,sitting up on stretcher watching TV  Continuous observation for [employer] at bedside,in progress       Nazanin Valderrama RN  01/22/20 1510

## 2020-01-22 NOTE — ED NOTES
Pt is asleep, resp is even and non labor  Diner tray at bedside  1:1 continues        Kota Tejeda RN  01/22/20 6060

## 2020-01-22 NOTE — ED NOTES
Received pt from the previous shift, pt is laying quietly in the stretcher reading a book        Isela Merchant RN  01/21/20 5492

## 2020-01-22 NOTE — ED NOTES
Pt noted ate 75% of his lunch tray  1/2 sub at bedside  Pt stated previously he would finish it       Alexsandra Winkler RN  01/22/20 8851

## 2020-01-22 NOTE — ED NOTES
Pt continues to rest quietly, no distress noted at this time  Will continue to monitor        Gen Blackwell RN  01/21/20 2035

## 2020-01-22 NOTE — ED NOTES
Patient is resting in bed quietly  No distress noted  1:1 continuous monitoring remains for patient safety  Will continue to monitor        Raquel Sim RN  01/22/20 2822

## 2020-01-22 NOTE — ED NOTES
Pt requested to use a phone  A portable phone is given  1:1 continues at bedside  Diner tray is untouched at bedside  Pt states that he is not hungry at this time        Mercedez Fraire RN  01/22/20 8252

## 2020-01-22 NOTE — ED NOTES
Pt noted to have repositioned self in the bed, resting quietly        Luke Ramirez RN  01/22/20 8043

## 2020-01-22 NOTE — ED NOTES
Pt smokes a pack a day  Nicotine patch ordered yesterday but reported fell off this morning  Nicotine patch order is obtained from MD  Denies any tooth pain today        Ayanna Miller, RAFFI  01/22/20 3988

## 2020-01-22 NOTE — ED NOTES
1/22/20 @ 0715:  Received call from [facility]; patient is referred to Enoch Mahan  PES will continue to monitor    Hola Pollard MS

## 2020-01-22 NOTE — ED NOTES
Patient continues on 1:1 observation for SI with sitter at [facility]       Janak Farrar RN  01/22/20 5989

## 2020-01-22 NOTE — ED NOTES
Received patient and he has been moved to room 22  Pt is awake, watching TV at this time  No distress noted  1:1 continues with Memo Henriquez at bedside        Lupe Fuller RN  01/22/20 6207

## 2020-01-22 NOTE — ED NOTES
Pt is sitting up at beside chair, watching TV   1:1 continues at bedside with Santi Pink RN  01/22/20 8[address]  SIRISHA Buckley  01/22/20 5280

## 2020-01-22 NOTE — ED NOTES
Pt noted to be laying in bed with eyes closed, no distress noted at this time        Dara Solano RN  01/22/20 9566

## 2020-01-23 RX ORDER — IBUPROFEN 600 MG/1
600 TABLET ORAL ONCE
Status: COMPLETED | OUTPATIENT
Start: 2020-01-23 | End: 2020-01-23

## 2020-01-23 RX ADMIN — IBUPROFEN 600 MG: 600 TABLET, FILM COATED ORAL at 08:33

## 2020-01-23 NOTE — ED NOTES
Pt is watching TV, asked the sitter nicely for the remote  1:1 at bedside and will continue to monitor        Ramonita Antoine RN  01/22/20 2006

## 2020-01-23 NOTE — ED NOTES
Pt sleeping quietly in bed   Remains on continuous observation     Severiano Beals, RAFFI  01/23/20 1854

## 2020-01-23 NOTE — ED NOTES
Sister showed up to visit with young child  Informed no visitors under 18  Sister visiting at bedside and began to agitate pt  Saying "Im going to call mom" pt shouting  Visit ended   Pt eating dinner on bed     Ingrid Kang RN  01/23/20 8968

## 2020-01-23 NOTE — ED NOTES
Pt sitting up in bed  Eating small bits from tray and watching peers   Remains on 1:1     Rc Billings RN  01/23/20 7271

## 2020-01-23 NOTE — ED NOTES
Pt sitting up at bedside chair  Reading a book,  Requested some water, water is given  Dr Luisa Davis at bedside        Danielle Rhodes RN  01/22/20 2023

## 2020-01-23 NOTE — ED NOTES
Sleeping  Breakfast ordered  Remains on constant observation       Brenda Barajas, RAFFI  01/23/20 0063

## 2020-01-23 NOTE — ED NOTES
Pt sleeping  Respirations easy and unlabored  Repositions self   Remains on continuous observation     Rubén Greco RN  01/23/20 0600

## 2020-01-23 NOTE — ED NOTES
Pt laying in bed quietly  Calm and cooperative at this time   Remains on 1:1     Stevie Acuna RN  01/23/20 5709

## 2020-01-23 NOTE — ED NOTES
Pt requested that tray to be removed from his room  Pt observed to be angry and when RN asked questions, he yelled  "just leave me alone" and covered himself with blanket  He furthered said " I'm held against my will here"  1:1 at bedside, will continue to monitor        Ami RAFFI Melgar  01/22/20 1916

## 2020-01-23 NOTE — ED CARE HANDOFF
Emergency Department Sign Out Note        Sign out and transfer of care from Dr Estella Pack  See Separate Emergency Department note  The patient, Claudell Alamin , was evaluated by the previous provider for medical clearance for psychiatric workup    Workup Completed:  Patient is medically cleared for psychiatric evaluation  ED Course / Workup Pending (followup): Patient is awake alert in his room reading a book  Patient does not have any complaints appears to be happy states he does not need anything at this point  Procedures  MDM    Disposition  Final diagnoses:   None     ED Disposition     None      Follow-up Information    None       Patient's Medications   Discharge Prescriptions    No medications on file     No discharge procedures on file         ED Provider  Electronically Signed by     Hector Schneider DO  01/22/20 2028

## 2020-01-23 NOTE — ED NOTES
Pt laying in bed with eyes open  Respirations easy and unlabored   Remains on continuous observation       [facility], [facility]  01/23/20 5745

## 2020-01-23 NOTE — ED NOTES
1/23/20 @ 0800:  [facility]; having difficulties with placement due to patient's circumstances, but bed search will continue; no beds located at this time  PES will continue to monitor  Abbi Dubon 53: Grace Wright completing telepsych to determine if continued commitment is required    Kevin Kebede MS

## 2020-01-23 NOTE — ED NOTES
Called Shaniqua Morrell / Mercedes for an update @ 18:45 - referred to [facility] and [address] Marilyn Morrell is attempting to borrow beds from other [facility] for this patient  Called Shaniqua Morrell / Yonatan Mix @ 22:55 - no beds

## 2020-01-24 NOTE — ED NOTES
Pt lying in bed watching TV, talking with staff and other patients in psych area, remains on constant observation     Vandana Prater RN  01/23/20 1630

## 2020-01-24 NOTE — ED NOTES
Pt sleeping when observed, repositions self in bed, remains on constant observation     Robert Torres RN  01/24/20 8369

## 2020-01-24 NOTE — ED NOTES
Pt observed lying in bed watching TV and adjusting blankets, remains on constant observation     Elijah English, RAFFI  01/24/20 9015

## 2020-01-24 NOTE — ED NOTES
Pt ate 100% of lunch  Sitting on bed  Calm and cooperative   Remains on 1:1     Everardo Landin RN  01/24/20 9704

## 2020-01-24 NOTE — ED NOTES
Pt remains awake, talking to staff and other patients in psych area, remains on constant observation     Aleksandr Johnson, RN  01/23/20 5044

## 2020-01-24 NOTE — ED NOTES
Pt laying in bed watching tv   Ate 100% of breakfast  Remains on 1:1     Jossy Velásquez RN  01/24/20 8527

## 2020-01-24 NOTE — ED NOTES
EKG faxed to [facility] @ 15:30 - called to verify receipt  Per [facility] / Amanda Card @ 19:10 - Toni Acuña has the chart and they are waiting for their Dr to review it

## 2020-01-24 NOTE — ED NOTES
Pt lying in bed watching TV, offers no complaints, denies being suicidal at this time   Pt encouraged to sleep, remains on constant observation     Elizabeth Ellis, RAFFI  01/23/20 8047

## 2020-01-24 NOTE — ED NOTES
Pt observed lying in bed, talking with staff and other patients in psych area, calm and cooperative at this time, remains on constant observation     Johanna Medel RN  01/23/20 8611

## 2020-01-24 NOTE — ED NOTES
Pt has been calm and cooperative  Remains on 1:1   Ate 100% of supper     Rafiq Aguilar, RN  01/24/20 8370

## 2020-01-24 NOTE — ED NOTES
Breakfast tray ordered  Pt laying in bed  Calm and cooperative   Remains on 1:1     [facility]  01/24/20 3899

## 2020-01-24 NOTE — ED NOTES
Pt observed lying in bed with eyes closed, appears to be sleeping, remains on constant observation     Jaclyn Samuel RN  01/24/20 2262

## 2020-01-25 RX ORDER — ACETAMINOPHEN 325 MG/1
650 TABLET ORAL ONCE
Status: COMPLETED | OUTPATIENT
Start: 2020-01-25 | End: 2020-01-25

## 2020-01-25 RX ADMIN — ACETAMINOPHEN 650 MG: 325 TABLET, FILM COATED ORAL at 17:17

## 2020-01-25 RX ADMIN — NICOTINE 21 MG: 21 PATCH, EXTENDED RELEASE TRANSDERMAL at 09:46

## 2020-01-25 NOTE — ED NOTES
Patient laying on stretcher in room,watching TV  Patient continues on 1:1 observation for SI with sitter at bedside       Palmira Isbell RN  01/25/20 2462

## 2020-01-25 NOTE — ED NOTES
Patient lying in stretcher  Pt calm, no distress noted       [facility]  01/24/20 7951

## 2020-01-25 NOTE — ED NOTES
Per [facility] pt still pending, NOT being transferred anywhere  [facility] supervisor made aware       Claude Rush RN  01/25/20 1101

## 2020-01-25 NOTE — ED NOTES
Pt sitting, watching tv at bedside  Pt pleasant and cooperative at this time       Marijo Favre, RN  01/25/20 2731

## 2020-01-25 NOTE — ED NOTES
Pt watching tv at bedside, pt calm and cooperative  1:1 at bedside       Kristen Bledsoe RN  01/25/20 4233

## 2020-01-25 NOTE — ED NOTES
Pt back to room from shower  1:1 remains for continuous observation        [address], RN  01/24/20 7023

## 2020-01-25 NOTE — ED NOTES
Pt sitting up in bed watching TV  Pt remains on continuous observation        [address] [facility]  01/25/20 8775

## 2020-01-25 NOTE — ED NOTES
Refused to order something for dinner  States "I'm not hungry" encouraged him to order a sandwich for later tonight but patient refused       Sampson Rizvi RN  01/25/20 2447

## 2020-01-25 NOTE — ED NOTES
Pt to room 1 to shower  Pt pleasant and calm as he's waiting for shower items  Pt states he's not having any suicidal thoughts at this time  "I know what I did was stupid, I have a son my son to worry about " Pt states the  woman he had his kid with has a BFA against him until the 9 month old baby is 1years old  Pt states he is going to get a job or "career"  at [employer] in [facility] so his son can be on his health benefits        [address]  01/24/20 8115

## 2020-01-25 NOTE — ED NOTES
Pt sitting quietly in weighted chair with blanket in lap  1:1 remains for continued observation       [address] RN  01/24/20 4952

## 2020-01-25 NOTE — ED NOTES
As per [facility], pt's chart is currently being reviewed at [facility]      Clint Goodson MA  Crisis Intervention Worker  01/25/20 9:40 AM

## 2020-01-25 NOTE — ED NOTES
Pt requesting to speak with a psychiatrist because he wants to leave      Lilliana Campos MA  Crisis Intervention Worker  01/25/20 5:12 PM

## 2020-01-25 NOTE — ED NOTES
Laying on stretcher with blanket over his head  No resp distress  Remains on 1:1 observation       Elisabet Rome RN  01/25/20 7040

## 2020-01-25 NOTE — ED NOTES
Pt requested to speak with PES  Pt asking for an update  Pt informed that his referral was out at [facility] and [employer]  Pt beginning to get agitated due to his length of stay in ED and he threatened to walk out of the ED  This writer acknowledged pt's frustration and told pt that he is unable to leave and if he tried, security would have to intervene  Pt continued to get agitated  This writer continued to speak with pt until he de-escalated  PES agreed to speak with [facility] again about pt's referral status  PES called [facility] and there is no update on pt's referral status      Surinder Puga MA  Crisis Intervention Worker  01/25/20 2:28 PM

## 2020-01-26 NOTE — ED NOTES
Pt's referral is out at [address]  Per Mingo Day from [facility], it looks like pt may be accepted by not until tomorrow morning  His referral still needs to be reviewed by the doctor at [facility]  Until then, the pt is not officially accepted  Someone from [facility] will be by to conduct an on-site consult for the pt        Faby Pagan MA  Crisis Intervention Worker  01/26/20 2:19 PM

## 2020-01-26 NOTE — ED NOTES
Received patient in room,awake,calm,cooperative,no behaviors noted  PAtient continues on 1:1 observation for SI with sitter at bedside       Misbah Goldsmith RN  01/26/20 6129

## 2020-01-26 NOTE — ED NOTES
Patient watching TV in room,no complaints offered  Patient continues on 1:1 observation for SI,patient with no noted SI at this time,pleasant and positive during conversation  Sitter at doorway       Blanca Ceron RN  01/26/20 9746

## 2020-01-26 NOTE — ED NOTES
Pt watching tv, no distress noted  Pt talking to 1:1 at bedside       Christian Vargas RN  01/26/20 9091

## 2020-01-26 NOTE — ED NOTES
As per Hanane Heck (crisis), pt will be tele psych tmr 1/26 at [address] [place] [address], [address] [place] [address]  01/25/20 2693

## 2020-01-26 NOTE — ED NOTES
Spoke with Shantanu Valerio from [facility]  It was clarified to this writer that there was a RAJENDRA obtained on the pt which means there will not be another telepsych completed on the pt unless it appears that the pt has had a significant improvement in his status  Someone from [facility] will be by to do an on-site consult  Should it be determined that the pt has improved enough to warrant another telepsych, it will then happen and the pt will either continue his commitment status, or be able to be discharged          Pinky Lozano MA  Crisis Intervention Worker  01/26/20 11:28 AM

## 2020-01-26 NOTE — ED NOTES
Patient watching TV in room  No behaviors noted,pleasant and cooperative,conversing with sitter  Patient on 1:1 observation for SI,but denies SI at this time  Patient expresses his desire to go home  Sitter at doorway       Blanca Ceron RN  01/26/20 9819

## 2020-01-26 NOTE — ED NOTES
Patient awake laying on stretcher, denies suicidal ideations at this time, no distress, remains on continuous observation     Broadus Goltz, RN  01/25/20 2370

## 2020-01-26 NOTE — ED NOTES
Patient laying on stretcher no distress, remains on continuous observation     Aliyah Bingham RN  01/26/20 6242

## 2020-01-26 NOTE — ED NOTES
Received pt, noted sleeping with blanket covered his face  Resp even and unlabored  1:1 continues with Aamir Myers at bedside        Cayden Kate RN  01/25/20 1696

## 2020-01-26 NOTE — ED NOTES
PES in to speak with patient and clarify [facility] explaining commitment and RAJENDRA status  Patient appears angry, despite PES attempt to clarify  Patient loudly told PES to get out and tossed portable phone on a table outside of the room  Patient walked back into room and sat self down on the bed  Security was present the entire time for safety, but ultimately not needed  Patient remains on continuous observation       Demi Holbrook RN  01/26/20 5516

## 2020-01-26 NOTE — ED NOTES
Patient continues to watch TV in room,watching a movie  Patient continues on 1:1 observation for SI with sitter at [facility]       Palmira Isbell RN  01/26/20 9335

## 2020-01-27 RX ORDER — ACETAMINOPHEN 325 MG/1
650 TABLET ORAL ONCE
Status: COMPLETED | OUTPATIENT
Start: 2020-01-27 | End: 2020-01-27

## 2020-01-27 RX ADMIN — ACETAMINOPHEN 650 MG: 325 TABLET, FILM COATED ORAL at 20:09

## 2020-01-27 NOTE — ED NOTES
Called Shaniqua Morrell / Ahmet Petty for an update about 16:30 - patient is referred to [facility] for a diversion bed - but they have no beds at this time  Patient asked to speak with clinician about 17:00 - patient asked how he was "302'd and then 303'd" - PES provided an explanation as well as an update on the bed search; patient verbalized he is "frustrated with being here so long"  Shaniqua Morrell / Lan Romero arrived @ 18:30 to meet with patient  Called Shaniqua Morrell / Christiano Carrasco @ 21:20 for an update - no beds and they will continue to look tomorrow

## 2020-01-27 NOTE — ED NOTES
Pt is playing card with Dayna at bedside  Pt states that he is just "angry" with the system  He understood that he is committed for the next 20 days and he feels that he is trapped here  Pt states "I've been trying to be good and compliance with everything here" but I feel that my right is taken away from me  Pt states "Im not suicidal, I don't want hurt anyone, I just want to go home to my family and get myself together " 1:1 continues        Cayden Kate RN  01/27/20 5314

## 2020-01-27 NOTE — ED NOTES
Pt sleeping, even and unlabored respirations   1:1 remains for continuous observation     [facility]  01/27/20 7855

## 2020-01-27 NOTE — ED NOTES
Pt sitting in the corner of the room with arms crossed, knees bent, head tilted down resting on arms  Stanley Lob to make conversation with patient, and pt stated "I just want to be left alone " 1:1 remains for continual observation       Estelita Hahn RN  01/26/20 100 [facility], [address] [place] [address]  01/26/20 9036

## 2020-01-27 NOTE — ED NOTES
1/27/20 @ [phone]:  PES [facility] for [facility]; patient has been declined at several facilities, and has been referred to the state, who approved a diversionary bed at [facility]; patient is on "wait list" there  PES will continue to monitor    Mateo Chavira, MS

## 2020-01-27 NOTE — ED NOTES
Pt refusing vital signs, telling RN  "get the fuck out of my [id number] [facility], RN  01/27/20 5591

## 2020-01-27 NOTE — ED NOTES
Pt sleeping in bed  1:1 remains for continous observation        [address] [facility], [facility]  01/26/20 5822

## 2020-01-27 NOTE — ED NOTES
Pt observed to play cards  No distress, no agitation noted  1:1 continues at bedside        Kwadwo Ferro RN  01/27/20 3356

## 2020-01-27 NOTE — ED NOTES
Received the patient, he is sleeping lying on his L side  Resp is easy  As report, pt has been committed for 20 days period at this time (RAJENDRA), awaiting for placement  Breakfast tray at bedside, 1:1 continues at bedside        Sravani Melgar RN  01/27/20 7977

## 2020-01-27 NOTE — ED NOTES
Pt is awake, playing card with tech  Eat 100% of his lunch  Observed to be in good mode, conversing with tech  1:1 continues        Sravani Melgar, RAFFI  01/27/20 8691

## 2020-01-27 NOTE — ED NOTES
As per [place] at [facility], pt was declined at [address]  Pt's referral is out at [facility] and he is still pending acceptance at [facility]      Get Wise MA  Crisis Intervention Worker  01/26/20 8:26 PM

## 2020-01-27 NOTE — ED NOTES
Pt is awake at this time, denies any pain  Allowing RN to take VS, refused Nicotine patch  1:1 continues with Dayna at bedside        Stephen Garrison RN  01/27/20 2912

## 2020-01-28 VITALS
WEIGHT: 206 LBS | HEART RATE: 51 BPM | DIASTOLIC BLOOD PRESSURE: 68 MMHG | RESPIRATION RATE: 18 BRPM | OXYGEN SATURATION: 96 % | TEMPERATURE: 98.3 F | SYSTOLIC BLOOD PRESSURE: 120 MMHG | BODY MASS INDEX: 29.56 KG/M2

## 2020-01-28 RX ORDER — ACETAMINOPHEN 325 MG/1
650 TABLET ORAL ONCE
Status: COMPLETED | OUTPATIENT
Start: 2020-01-28 | End: 2020-01-28

## 2020-01-28 RX ORDER — IBUPROFEN 600 MG/1
600 TABLET ORAL ONCE
Status: COMPLETED | OUTPATIENT
Start: 2020-01-28 | End: 2020-01-28

## 2020-01-28 RX ADMIN — ACETAMINOPHEN 650 MG: 325 TABLET, FILM COATED ORAL at 14:27

## 2020-01-28 RX ADMIN — IBUPROFEN 600 MG: 600 TABLET, FILM COATED ORAL at 15:13

## 2020-01-28 NOTE — ED NOTES
Pt noted to have repositioned self in the bed, resting quietly        Lesia Gandhi RN  01/28/20 2585

## 2020-01-28 NOTE — ED NOTES
Pt continues to rest quietly in the stretcher, no distress  Will continue to monitor        Pete De Santiago RN  01/28/20 6018

## 2020-01-28 NOTE — ED NOTES
Pt noted to be laying in bed with eyes closed, no distress noted at this time        Cristela Arellano RN  01/27/20 0840

## 2020-01-28 NOTE — ED NOTES
Pt continues to rest quietly in the stretcher, no distress at this time  Will continue to monitor        Ruddy Wells RN  01/28/20 3169

## 2020-01-28 NOTE — ED NOTES
Received patient in room,watching TV  No behaviors noted  Patient continues on 1:1 observation for SI with sitter at doorway       Sly Gutierrez RN  01/28/20 2686

## 2020-01-28 NOTE — ED NOTES
Pt rocking and tearful  Reports mouth pain is still severe  Dr Cheyenne Ruggiero notified        Gerald Young RN  01/28/20 1335

## 2020-01-28 NOTE — ED NOTES
1/28/20 @ [phone]:  [facility]; patient currently on "wait list" at [facility]  PES will continue to monitor  [address] [facility], [address]: PES received message from Shena Rowell [facility]; patient declined at [facility] due to homelessness and unit acuity; remains on "wait list" at [facility]    [facility] Kebede, MS

## 2020-01-28 NOTE — ED NOTES
Pt awake in bed   Remains on continuous observation     [facility]  01/28/20 7935

## 2020-01-28 NOTE — ED NOTES
Pt resting quietly in the bed, respirations easy and unlabored        Valdez RAFFI Carmen  01/28/20 7473

## 2020-01-28 NOTE — ED NOTES
Pt ambulates to bathroom, has been playing card all day with sitter  Mood is pleasant, denies SI  1:1 continues  Completed  his diner 100%        Dominic Chavez, RN  01/27/20 1946

## 2020-01-28 NOTE — ED NOTES
Received pt from the previous shift, pt is resting quietly in the stretcher  No distress noted at this time  1:1 observation in place        Des Cid RN  01/27/20 8894

## 2020-01-28 NOTE — ED NOTES
Pt noted to have repositioned self in the bed, resting quietly        Danica Ayala RN  01/28/20 2873

## 2020-01-28 NOTE — ED NOTES
Called [facility] / Mar Jimenez for an update about 16:50 - [facility] has the referral for a diversion bed - maybe as soon as tonight? Patient was updated  Clementine Hooper / Ferny Hung called about 18:00 - accepted to [facility] via Dr Johanny Whitehead; Rn report to be called to [phone] - O for  and ask for "GAU" unit  Maninder Groves / Edelmira Alfredo called for transport - scheduled for 22:00  Patient; ER staff and Clementine Hooper / Dmitri Fearing all updated

## 2020-01-28 NOTE — ED NOTES
Pt resting quietly in the bed, respirations easy and unlabored        Gisella Cuenca RN  01/27/20 9520

## 2020-01-28 NOTE — EMTALA/ACUTE CARE TRANSFER
[address] [address]  Dept: [phone]      EMTALA TRANSFER CONSENT    NAME Maria A Ahn   1990                              MRN [id number]    I have been informed of my rights regarding examination, treatment, and transfer   by Dr Doan att  providers found    Benefits: Specialized equipment and/or services available at the receiving facility (Include comment)________________________    Risks: Potential for delay in receiving treatment      Consent for Transfer:  I acknowledge that my medical condition has been evaluated and explained to me by the emergency department physician or other qualified medical person and/or my attending physician, who has recommended that I be transferred to the service of  Accepting Physician: Dr Mainor Self at [address] [place] [address] Name, [address] : [address]  The above potential benefits of such transfer, the potential risks associated with such transfer, and the probable risks of not being transferred have been explained to me, and I fully understand them  The doctor has explained that, in my case, the benefits of transfer outweigh the risks  I agree to be transferred  I authorize the performance of emergency medical procedures and treatments upon me in both transit and upon arrival at the receiving facility  Additionally, I authorize the release of any and all medical records to the receiving facility and request they be transported with me, if possible  I understand that the safest mode of transportation during a medical emergency is an ambulance and that the Hospital advocates the use of this mode of transport  Risks of traveling to the receiving facility by car, including absence of medical control, life sustaining equipment, such as oxygen, and medical personnel has been explained to me and I fully understand them      ([place] BELOW)  [  ]  I consent to the stated transfer and to be transported by ambulance/helicopter  [  ]  I consent to the stated transfer, but refuse transportation by ambulance and accept full responsibility for my transportation by car  I understand the risks of non-ambulance transfers and I exonerate the Hospital and its staff from any deterioration in my condition that results from this refusal     X___________________________________________    DATE  20  TIME________  Signature of patient or legally responsible individual signing on patient behalf           RELATIONSHIP TO PATIENT_________________________          Provider Certification    NAME Jluis Kaiser   1990                              MRN [id number]    A medical screening exam was performed on the above named patient  Based on the examination:    Condition Necessitating Transfer The primary encounter diagnosis was Overdose  A diagnosis of Suicide attempt [facility]) was also pertinent to this visit  Patient Condition: The patient has been stabilized such that within reasonable medical probability, no material deterioration of the patient condition or the condition of the unborn child(jyothi) is likely to result from the transfer    Reason for Transfer: Level of Care needed not available at this facility    Transfer Requirements: [address] [place] [address]   · Space available and qualified personnel available for treatment as acknowledged by    · Agreed to accept transfer and to provide appropriate medical treatment as acknowledged by       Dr Jennifer Vickers  · Appropriate medical records of the examination and treatment of the patient are provided at the time of transfer   [address], [address] _______  · Transfer will be performed by qualified personnel from    and appropriate transfer equipment as required, including the use of necessary and appropriate life support measures      Provider Certification: I have examined the patient and explained the following risks and benefits of being transferred/refusing transfer to the patient/family:  The patient is stable for psychiatric transfer because they are medically stable, and is protected from harming him/herself or others during transport      Based on these reasonable risks and benefits to the patient and/or the unborn child(jyothi), and based upon the information available at the time of the patients examination, I certify that the medical benefits reasonably to be expected from the provision of appropriate medical treatments at another medical facility outweigh the increasing risks, if any, to the individuals medical condition, and in the case of labor to the unborn child, from effecting the transfer      X____________________________________________ DATE 01/28/20        TIME_______      ORIGINAL - SEND TO MEDICAL RECORDS   COPY - SEND WITH PATIENT DURING TRANSFER

## 2020-01-28 NOTE — ED NOTES
Pt requested to take a shower, supplies are provided for the pt  1:1 continues        Desirae Jasmine RN  01/27/20 2015

## 2020-01-28 NOTE — ED NOTES
Continuous observation remains in place at this time  Pt laying quietly in the stretcher        Valdez Nella, RAFFI  01/28/20 6851

## 2020-01-28 NOTE — ED NOTES
Pt requested pain meds for tooth pain  MD aware   Remains on 1:1     Rc Billings, RAFFI  01/28/20 6036

## 2020-01-28 NOTE — ED NOTES
Pt noted to be laying in bed with eyes closed, no distress noted at this time        Antwan Watson RN  01/28/20 4365

## 2022-04-25 ENCOUNTER — HOSPITAL ENCOUNTER (EMERGENCY)
Facility: HOSPITAL | Age: 32
Discharge: HOME | End: 2022-04-25
Attending: EMERGENCY MEDICINE

## 2022-04-25 VITALS
DIASTOLIC BLOOD PRESSURE: 83 MMHG | OXYGEN SATURATION: 100 % | SYSTOLIC BLOOD PRESSURE: 124 MMHG | RESPIRATION RATE: 16 BRPM | TEMPERATURE: 98.3 F | HEART RATE: 62 BPM

## 2022-04-25 DIAGNOSIS — J06.9 ACUTE URI: Primary | ICD-10-CM

## 2022-04-25 PROCEDURE — 99284 EMERGENCY DEPT VISIT MOD MDM: CPT | Performed by: EMERGENCY MEDICINE

## 2022-04-25 PROCEDURE — 87636 SARSCOV2 & INF A&B AMP PRB: CPT | Performed by: EMERGENCY MEDICINE

## 2022-04-25 PROCEDURE — 99283 EMERGENCY DEPT VISIT LOW MDM: CPT

## 2022-04-25 RX ORDER — IBUPROFEN 600 MG/1
600 TABLET ORAL ONCE
Status: COMPLETED | OUTPATIENT
Start: 2022-04-25 | End: 2022-04-25

## 2022-04-25 RX ORDER — ACETAMINOPHEN 325 MG/1
650 TABLET ORAL ONCE
Status: COMPLETED | OUTPATIENT
Start: 2022-04-25 | End: 2022-04-25

## 2022-04-25 RX ADMIN — ACETAMINOPHEN 650 MG: 325 TABLET ORAL at 21:23

## 2022-04-25 RX ADMIN — IBUPROFEN 600 MG: 600 TABLET ORAL at 21:23

## 2022-04-25 NOTE — Clinical Note
Quan Odom was seen and treated in our emergency department on 4/25/2022  Diagnosis:     Flo Koyanagi  may return to work on return date  He may return on this date: 05/02/2022         If you have any questions or concerns, please don't hesitate to call        Aleena Maya MD    ______________________________           _______________          _______________  Hospital Representative                              Date                                Time

## 2022-04-26 LAB
FLUAV RNA RESP QL NAA+PROBE: NEGATIVE
FLUBV RNA RESP QL NAA+PROBE: NEGATIVE
SARS-COV-2 RNA RESP QL NAA+PROBE: NEGATIVE

## 2022-04-26 NOTE — ED PROVIDER NOTES
History  Chief Complaint   Patient presents with    Biological Exposure     pt would like a covid test  1 week of HA, bodyaches and sore throat  Patient is a 29-year-old male seen in the emergency department with concern for 1 week of headache, body aches, sore throat, cough, nasal congestion/runny nose  Patient notes associated fever at home  Patient states that he has had multiple sick contacts, including a baby at home  Patient states that he took some vitamin-C supplement for symptom control, with minimal improvement of symptoms  Patient notes no chest pain, shortness of breath, nausea, vomiting  Patient does note some diarrhea over approximately the past day at home as well  Patient is requesting a COVID-19 test           Prior to Admission Medications   Prescriptions Last Dose Informant Patient Reported? Taking?   ibuprofen (MOTRIN) 800 mg tablet   No No   Sig: Take 1 tablet (800 mg total) by mouth every 6 (six) hours as needed for mild pain for up to 10 days   nicotine (NICODERM CQ) 21 mg/24 hr TD 24 hr patch   No No   Sig: Place 1 patch on the skin daily   Patient not taking: Reported on 9/2/2019      Facility-Administered Medications: None       History reviewed  No pertinent past medical history  Past Surgical History:   Procedure Laterality Date    INCISION AND DRAINAGE INTRA ORAL ABSCESS Right 5/27/2019    Procedure: INCISION AND DRAINAGE  (I&D) R CANINE SPACE;  Surgeon: Roseann Oconnell DMD;  [facility];  Service: Maxillofacial    REMOVAL OF IMPACTED TOOTH - COMPLETELY BONY N/A 5/27/2019    Procedure: EXTRACTION TEETH #1,2,7,8,10,15,16,17;  Surgeon: Roseann Oconnell DMD;  [facility];  Service: Maxillofacial       History reviewed  No pertinent family history  I have reviewed and agree with the history as documented      E-Cigarette/Vaping     E-Cigarette/Vaping Substances     Social History     Tobacco Use    Smoking status: Current Every Day Smoker Packs/day: 1 00     Types: Cigarettes    Smokeless tobacco: Former User   Substance Use Topics    Alcohol use: Yes     Comment: socially    Drug use: Yes     Types: Marijuana       Review of Systems   Constitutional: Positive for fever  Negative for unexpected weight change  HENT: Positive for congestion, rhinorrhea and sore throat  Negative for ear pain  Eyes: Negative for pain and visual disturbance  Respiratory: Positive for cough  Negative for shortness of breath  Cardiovascular: Negative for chest pain and palpitations  Gastrointestinal: Positive for diarrhea  Negative for abdominal pain, nausea and vomiting  Genitourinary: Negative for decreased urine volume and difficulty urinating  Musculoskeletal: Positive for arthralgias and myalgias  Negative for gait problem  Skin: Negative for color change and rash  Neurological: Positive for headaches  Negative for seizures and syncope  Psychiatric/Behavioral: Negative for agitation and confusion  Physical Exam  Physical Exam  Vitals and nursing note reviewed  Constitutional:       General: He is not in acute distress  Appearance: He is well-developed  HENT:      Head: Normocephalic and atraumatic  Right Ear: External ear normal       Left Ear: External ear normal       Nose: Nose normal       Mouth/Throat:      Mouth: Mucous membranes are moist       Pharynx: Posterior oropharyngeal erythema present  No oropharyngeal exudate  Eyes:      General: No scleral icterus  Conjunctiva/sclera: Conjunctivae normal    Cardiovascular:      Rate and Rhythm: Normal rate and regular rhythm  Heart sounds: No murmur heard  Pulmonary:      Effort: Pulmonary effort is normal  No respiratory distress  Breath sounds: Normal breath sounds  Abdominal:      General: There is no distension  Palpations: Abdomen is soft  Tenderness: There is no abdominal tenderness     Musculoskeletal:         General: No deformity or signs of injury  Cervical back: Normal range of motion and neck supple  Skin:     General: Skin is warm and dry  Neurological:      General: No focal deficit present  Mental Status: He is alert  Cranial Nerves: No cranial nerve deficit  Sensory: No sensory deficit  Psychiatric:         Mood and Affect: Mood normal          Behavior: Behavior normal          Thought Content: Thought content normal          Vital Signs  ED Triage Vitals [04/25/22 2053]   Temperature Pulse Respirations Blood Pressure SpO2   98 3 °F (36 8 °C) 62 16 124/83 100 %      Temp Source Heart Rate Source Patient Position - Orthostatic VS BP Location FiO2 (%)   Oral -- Sitting Right arm --      Pain Score       No Pain           Vitals:    04/25/22 2053   BP: 124/83   Pulse: 62   Patient Position - Orthostatic VS: Sitting         Visual Acuity      ED Medications  Medications   ibuprofen (MOTRIN) tablet 600 mg (600 mg Oral Given 4/25/22 2123)   acetaminophen (TYLENOL) tablet 650 mg (650 mg Oral Given 4/25/22 2123)       Diagnostic Studies  Results Reviewed     Procedure Component Value Units Date/Time    COVID/FLU - 24 hour TAT [[id number]] Collected: 04/25/22 2123    Lab Status: In process Specimen: Nasopharyngeal Swab Updated: 04/25/22 2127                 No orders to display              Procedures  Procedures         ED Course                               SBIRT 20yo+      Most Recent Value   SBIRT (25 yo +)    In order to provide better care to our patients, we are screening all of our patients for alcohol and drug use  Would it be okay to ask you these screening questions? No Filed at: 04/25/2022 2054                    MDM  Number of Diagnoses or Management Options  Acute URI  Diagnosis management comments: Patient is a 64-year-old male seen in the emergency department with concern for fever, headache, sore throat, cough, some congestion, rhinorrhea, body aches    Evaluation is consistent with upper respiratory infection, likely viral in etiology  COVID-19 test was ordered during global pandemic  Evaluation is not consistent with pneumonia or meningitis  Patient was treated with medication for symptom control  Plan to have patient follow up with PCP  Patient stable for discharge home  Discharge instructions were reviewed with patient  Amount and/or Complexity of Data Reviewed  Clinical lab tests: ordered        Disposition  Final diagnoses:   Acute URI     Time reflects when diagnosis was documented in both MDM as applicable and the Disposition within this note     Time User Action Codes Description Comment    4/25/2022  9:17 PM Jina Bowles [J06 9] Acute URI       ED Disposition     ED Disposition Condition Date/Time Comment    Discharge Stable Mon Apr 25, 2022  9:17 PM Jhony Wilks  discharge to home/self care  Follow-up Information     Follow up With Specialties Details Why Contact Info Additional Information    Your primary doctor  Call in 1 day       [place] Call  As needed [address] [id number]  [address]  , [place], [place], [address]          Discharge Medication List as of 4/25/2022  9:18 PM      CONTINUE these medications which have NOT CHANGED    Details   ibuprofen (MOTRIN) 800 mg tablet Take 1 tablet (800 mg total) by mouth every 6 (six) hours as needed for mild pain for up to 10 days, Starting Sat 12/28/2019, Until Tue 1/7/2020, Normal      nicotine (NICODERM CQ) 21 mg/24 hr TD 24 hr patch Place 1 patch on the skin daily, Starting Thu 5/30/2019, Print             No discharge procedures on file      PDMP Review       Value Time User    PDMP Reviewed  Yes 11/6/2019  3:36 AM Mitch Hartmann PA-C          ED Provider  Electronically Signed by           Abdiaziz Vogel MD  04/25/22 4959

## 2022-12-04 ENCOUNTER — HOSPITAL ENCOUNTER (EMERGENCY)
Facility: HOSPITAL | Age: 32
Discharge: HOME | End: 2022-12-04
Attending: EMERGENCY MEDICINE

## 2022-12-04 VITALS
DIASTOLIC BLOOD PRESSURE: 95 MMHG | HEART RATE: 88 BPM | SYSTOLIC BLOOD PRESSURE: 173 MMHG | TEMPERATURE: 96.9 F | RESPIRATION RATE: 18 BRPM | OXYGEN SATURATION: 96 %

## 2022-12-04 DIAGNOSIS — R03.0 ELEVATED BLOOD PRESSURE READING: ICD-10-CM

## 2022-12-04 DIAGNOSIS — R19.02 LEFT UPPER QUADRANT ABDOMINAL MASS: ICD-10-CM

## 2022-12-04 DIAGNOSIS — R68.89 FLU-LIKE SYMPTOMS: Primary | ICD-10-CM

## 2022-12-04 LAB
FLUAV RNA RESP QL NAA+PROBE: NEGATIVE
FLUBV RNA RESP QL NAA+PROBE: NEGATIVE
RSV RNA RESP QL NAA+PROBE: NEGATIVE
SARS-COV-2 RNA RESP QL NAA+PROBE: NEGATIVE

## 2022-12-04 RX ORDER — NAPROXEN 250 MG/1
250 TABLET ORAL ONCE
Status: DISCONTINUED | OUTPATIENT
Start: 2022-12-04 | End: 2022-12-04 | Stop reason: HOSPADM

## 2022-12-04 RX ORDER — NAPROXEN 250 MG/1
250 TABLET ORAL 2 TIMES DAILY WITH MEALS
Qty: 20 TABLET | Refills: 0 | Status: SHIPPED | OUTPATIENT
Start: 2022-12-04

## 2022-12-04 NOTE — ED PROVIDER NOTES
Pt Name: Mariya Underwood  MRN: [id number]  Birthdate 1990  Age/Sex: 28 y o  male  Date of evaluation: 2022  PCP: No primary care provider on file  CHIEF COMPLAINT    Chief Complaint   Patient presents with   • Flu Symptoms     Pt arrived with c/o flu like symptoms         HPI    28 y o  male presenting with cough, nasal congestion, body aches, sore throat patient states the symptoms began 2-3 days ago, have gotten somewhat worse  He took over-the-counter cold remedy 2 days ago with minimal relief, has not taken anything since  He denies fever, nausea, vomiting, trauma, chest pain, shortness of breath, other symptoms  Patient denies any sick contacts  HPI      Past Medical and Surgical History    History reviewed  No pertinent past medical history  Past Surgical History:   Procedure Laterality Date   • INCISION AND DRAINAGE INTRA ORAL ABSCESS Right 2019    Procedure: INCISION AND DRAINAGE  (I&D) R CANINE SPACE;  Surgeon: Yair Sanchez DMD;  [facility];  Service: Maxillofacial   • REMOVAL OF IMPACTED TOOTH - COMPLETELY BONY N/A 2019    Procedure: EXTRACTION TEETH #1,2,7,8,10,15,16,17;  Surgeon: Yair Sanchez DMD;  [facility];  Service: Maxillofacial       History reviewed  No pertinent family history  Social History     Tobacco Use   • Smoking status: Every Day     Packs/day: 1 00     Types: Cigarettes   • Smokeless tobacco: Former   Substance Use Topics   • Alcohol use: Yes     Comment: socially   • Drug use: Yes     Types: Marijuana           Allergies    No Known Allergies    Home Medications    Prior to Admission medications    Medication Sig Start Date End Date Taking?  Authorizing Provider   ibuprofen (MOTRIN) 800 mg tablet Take 1 tablet (800 mg total) by mouth every 6 (six) hours as needed for mild pain for up to 10 days 19  Madie Pagan PA-C   nicotine (NICODERM CQ) 21 mg/24 hr TD 24 hr patch Place 1 patch on the skin daily  Patient not taking: Reported on 9/2/2019 5/30/19   Linette Adame MD           Review of Systems    Review of Systems   Constitutional: Negative for appetite change, chills and diaphoresis  HENT: Positive for congestion and sore throat  Negative for drooling, facial swelling, trouble swallowing and voice change  Respiratory: Positive for cough  Negative for apnea, shortness of breath and wheezing  Cardiovascular: Negative for chest pain and leg swelling  Gastrointestinal: Negative for abdominal distention, abdominal pain, diarrhea, nausea and vomiting  Genitourinary: Negative for dysuria and urgency  Musculoskeletal: Positive for myalgias  Negative for arthralgias, back pain, gait problem and neck pain  Skin: Negative for color change, rash and wound  Neurological: Negative for seizures, speech difficulty, weakness and headaches  Psychiatric/Behavioral: Negative for agitation, behavioral problems and dysphoric mood  The patient is not nervous/anxious  All other systems reviewed and negative  Physical Exam      ED Triage Vitals [12/04/22 1030]   Temperature Pulse Respirations Blood Pressure SpO2   (!) 96 9 °F (36 1 °C) (!) 110 18 (!) 173/95 97 %      Temp Source Heart Rate Source Patient Position - Orthostatic VS BP Location FiO2 (%)   Oral Monitor Sitting Left arm --      Pain Score       --               Physical Exam  Vitals and nursing note reviewed  Constitutional:       General: He is not in acute distress  Appearance: He is well-developed  He is not ill-appearing, toxic-appearing or diaphoretic  HENT:      Head: Normocephalic and atraumatic  Right Ear: Tympanic membrane, ear canal and external ear normal       Left Ear: Tympanic membrane, ear canal and external ear normal       Nose: Congestion and rhinorrhea present  Mouth/Throat:      Mouth: Mucous membranes are moist       Pharynx: Oropharynx is clear  Posterior oropharyngeal erythema present   No oropharyngeal exudate  Comments: Mild erythema of posterior oropharynx, no exudate, no swelling  Phonating normally, tolerating secretions  No uvular deviation or bulging of the soft palate  Eyes:      Conjunctiva/sclera: Conjunctivae normal       Pupils: Pupils are equal, round, and reactive to light  Neck:      Trachea: No tracheal deviation  Cardiovascular:      Rate and Rhythm: Normal rate and regular rhythm  Pulses: Normal pulses  Heart sounds: Normal heart sounds  No murmur heard  Pulmonary:      Effort: Pulmonary effort is normal  No respiratory distress  Breath sounds: Normal breath sounds  No stridor  No wheezing or rales  Abdominal:      General: There is no distension  Palpations: Abdomen is soft  Tenderness: There is no abdominal tenderness  There is no guarding or rebound  Musculoskeletal:         General: No deformity  Normal range of motion  Cervical back: Normal range of motion and neck supple  Skin:     General: Skin is warm and dry  Capillary Refill: Capillary refill takes less than 2 seconds  Findings: No rash  Neurological:      Mental Status: He is alert and oriented to person, place, and time  Psychiatric:         Behavior: Behavior normal          Thought Content: Thought content normal          Judgment: Judgment normal               Diagnostic Results      Labs:    Results Reviewed     Procedure Component Value Units Date/Time    FLU/RSV/COVID - if FLU/RSV clinically relevant [[id number]]  (Normal) Collected: 12/04/22 1033    Lab Status: Final result Specimen: Nares from Nose Updated: 12/04/22 1117     SARS-CoV-2 Negative     INFLUENZA A PCR Negative     INFLUENZA B PCR Negative     RSV PCR Negative    Narrative:      FOR PEDIATRIC PATIENTS - copy/paste COVID Guidelines URL to browser: [email or web address]    SARS-CoV-2 assay is a Nucleic Acid Amplification assay intended for the  qualitative detection of nucleic acid from SARS-CoV-2 in nasopharyngeal  swabs  Results are for the presumptive identification of SARS-CoV-2 RNA  Positive results are indicative of infection with SARS-CoV-2, the virus  causing COVID-19, but do not rule out bacterial infection or co-infection  with other viruses  Laboratories within the [facility] and its  territories are required to report all positive results to the appropriate  public health authorities  Negative results do not preclude SARS-CoV-2  infection and should not be used as the sole basis for treatment or other  patient management decisions  Negative results must be combined with  clinical observations, patient history, and epidemiological information  This test has not been FDA cleared or approved  This test has been authorized by FDA under an Emergency Use Authorization  (EUA)  This test is only authorized for the duration of time the  declaration that circumstances exist justifying the authorization of the  emergency use of an in vitro diagnostic tests for detection of SARS-CoV-2  virus and/or diagnosis of COVID-19 infection under section 564(b)(1) of  the Act, 21 U  S C  372OGK-5(E)(6), unless the authorization is terminated  or revoked sooner  The test has been validated but independent review by FDA  and CLIA is pending  Test performed using [employer] Gene[employer]pert: This RT-PCR assay targets N2,  a region unique to SARS-CoV-2  A conserved region in the E-gene was chosen  for pan-Sarbecovirus detection which includes SARS-CoV-2  According to CMS-2020-01-R, this platform meets the definition of high-throughput technology            All labs reviewed and utilized in the medical decision making process    Radiology:    No orders to display       All radiology studies independently viewed by me and interpreted by the radiologist     Procedure    Procedures        ED Course of Care and Re-Assessments      Naprosyn ordered, patient departed prior to receiving the medication  At time of discharge, patient also notes that he has had a mass in his abdomen for several months and requests evaluation of same  The left upper quadrant, small mobile firm but not rock hard mass noted, nontender, no erythema or other skin findings, feels to be within the soft tissues of the abdominal wall  Overall, felt most consistent with lipoma, counseled regarding suspected diagnosis as well as diagnostic uncertainty, referred to primary care doctor for further workup  Medications - No data to display        FINAL IMPRESSION    Final diagnoses:   Flu-like symptoms   Left upper quadrant abdominal mass   Elevated blood pressure reading         DISPOSITION/PLAN    Presentation as above with flu-like symptoms had elevated blood pressure reading  Vital signs as above, initial tachycardia resolved on recheck, elevated blood pressure noted likely situational   Viral testing negative, based on overall syndrome felt most consistent with viral etiology, possibly nontender to repair influenza  Low suspicion for bacterial pneumonia, sepsis, meningitis, encephalitis, pneumothorax, other acute life threat  Counseled regarding suspected diagnosis and conservative therapy at home, discharged strict return precautions, follow up primary care doctor  Time reflects when diagnosis was documented in both MDM as applicable and the Disposition within this note     Time User Action Codes Description Comment    12/4/2022 11:35 AM Sara CROWELL Add [R68 89] Flu-like symptoms     12/4/2022 11:36 AM Sara CROWELL Add [R19 02] Left upper quadrant abdominal mass     12/4/2022 11:40 AM Sara CROWELL Add [R03 0] Elevated blood pressure reading       ED Disposition     ED Disposition   Discharge    Condition   Stable    Date/Time   Sun Dec 4, 2022 11:34 AM    Comment   Debby Jaime  discharge to home/self care                 Follow-up Information     Follow up With Specialties Details Why Contact Info Additional [address] [facility] Emergency Department Emergency Medicine Go to  If symptoms worsen [address] [facility] [id number] [facility] Emergency Department, [address], [facility], [address], [address], [address] Nurse Practitioner Call in 1 day Establish primary care and schedule close outpatient follow-up  [address] [place] [address]               [facility]:    [facility]  [address] [facility] [address] [phone]  Go to   If symptoms worsen    ALMA Ohara  [address] [facility]  [phone]    Call in 1 day  Establish primary care and schedule close outpatient follow-up  DISCHARGE MEDICATIONS:    Discharge Medication List as of 12/4/2022 11:40 AM      START taking these medications    Details   naproxen (NAPROSYN) 250 mg tablet Take 1 tablet (250 mg total) by mouth 2 (two) times a day with meals, Starting Sun 12/4/2022, Print         CONTINUE these medications which have NOT CHANGED    Details   nicotine (NICODERM CQ) 21 mg/24 hr TD 24 hr patch Place 1 patch on the skin daily, Starting Thu 5/30/2019, Print         STOP taking these medications       ibuprofen (MOTRIN) 800 mg tablet Comments:   Reason for Stopping:               No discharge procedures on file  Terri Delgado MD    Portions of the record may have been created with voice recognition software  Occasional wrong word or "sound alike" substitutions may have occurred due to the inherent limitations of voice recognition software    Please read the chart carefully and recognize, using context, where substitutions have occurred     Terri Delgado MD  12/04/22 0702

## 2023-05-11 ENCOUNTER — HOSPITAL ENCOUNTER (EMERGENCY)
Facility: HOSPITAL | Age: 33
Discharge: HOME | End: 2023-05-11
Attending: EMERGENCY MEDICINE

## 2023-05-11 VITALS
HEART RATE: 89 BPM | TEMPERATURE: 99 F | DIASTOLIC BLOOD PRESSURE: 93 MMHG | RESPIRATION RATE: 18 BRPM | SYSTOLIC BLOOD PRESSURE: 157 MMHG | OXYGEN SATURATION: 96 %

## 2023-05-11 DIAGNOSIS — K04.7 DENTAL INFECTION: Primary | ICD-10-CM

## 2023-05-11 RX ORDER — PENICILLIN V POTASSIUM 500 MG/1
500 TABLET ORAL 4 TIMES DAILY
Qty: 28 TABLET | Refills: 0 | Status: SHIPPED | OUTPATIENT
Start: 2023-05-11 | End: 2023-05-18

## 2023-05-11 RX ORDER — PENICILLIN V POTASSIUM 250 MG/1
500 TABLET ORAL ONCE
Status: COMPLETED | OUTPATIENT
Start: 2023-05-11 | End: 2023-05-11

## 2023-05-11 RX ADMIN — PENICILLIN V POTASSIUM 500 MG: 250 TABLET, FILM COATED ORAL at 17:27

## 2023-05-11 NOTE — DISCHARGE INSTRUCTIONS
Please try to stop smoking  Please take tylenol or ibuprofen for pain, please do not exceed daily limits on packaging  Please take full course of antibiotics  Please return to the emergency department if you develop any increased swelling under your jaw or neck      Please follow-up with dental clinic, phone number provided on discharge papers

## 2023-05-11 NOTE — ED PROVIDER NOTES
History  Chief Complaint   Patient presents with   • Oral Swelling     Possible abcess     HPI     Patient presents to the emergency department for evaluation of left-sided lower dental pain, swelling  History of dental infections in the past   Not currently on antibiotics  Does smoke daily  States he does not have access to a dentist at this time  Denies any systemic symptoms  Denies any difficulty swallowing  Denies any trismus or difficulty speaking  Prior to Admission Medications   Prescriptions Last Dose Informant Patient Reported? Taking?   naproxen (NAPROSYN) 250 mg tablet   No No   Sig: Take 1 tablet (250 mg total) by mouth 2 (two) times a day with meals   nicotine (NICODERM CQ) 21 mg/24 hr TD 24 hr patch   No No   Sig: Place 1 patch on the skin daily   Patient not taking: Reported on 9/2/2019      Facility-Administered Medications: None       No past medical history on file  Past Surgical History:   Procedure Laterality Date   • INCISION AND DRAINAGE INTRA ORAL ABSCESS Right 5/27/2019    Procedure: INCISION AND DRAINAGE  (I&D) R CANINE SPACE;  Surgeon: Marisa Wright DMD;  [facility];  Service: Maxillofacial   • REMOVAL OF IMPACTED TOOTH - COMPLETELY BONY N/A 5/27/2019    Procedure: EXTRACTION TEETH #1,2,7,8,10,15,16,17;  Surgeon: Marisa Wright DMD;  [facility];  Service: Maxillofacial       No family history on file  I have reviewed and agree with the history as documented  E-Cigarette/Vaping     E-Cigarette/Vaping Substances     Social History     Tobacco Use   • Smoking status: Every Day     Packs/day: 1 00     Types: Cigarettes   • Smokeless tobacco: Former   Substance Use Topics   • Alcohol use: Yes     Comment: socially   • Drug use: Yes     Types: Marijuana       Review of Systems   HENT: Positive for dental problem  All other systems reviewed and are negative  Physical Exam  Physical Exam  Vitals and nursing note reviewed     Constitutional: General: He is not in acute distress  Appearance: He is well-developed  He is not diaphoretic  HENT:      Head: Normocephalic and atraumatic  Right Ear: External ear normal       Left Ear: External ear normal       Mouth/Throat:      Comments: Poor dentition throughout, tenderness to the left lower jawline  No submental fullness  Eyes:      General:         Right eye: No discharge  Left eye: No discharge  Pupils: Pupils are equal, round, and reactive to light  Neck:      Thyroid: No thyromegaly  Trachea: No tracheal deviation  Cardiovascular:      Rate and Rhythm: Normal rate and regular rhythm  Heart sounds: No murmur heard  Pulmonary:      Effort: Pulmonary effort is normal       Breath sounds: Normal breath sounds  Abdominal:      General: Bowel sounds are normal  There is no distension  Palpations: Abdomen is soft  Tenderness: There is no abdominal tenderness  Musculoskeletal:         General: No deformity  Normal range of motion  Cervical back: Normal range of motion and neck supple  Skin:     General: Skin is warm  Capillary Refill: Capillary refill takes less than 2 seconds  Neurological:      Mental Status: He is alert and oriented to person, place, and time  Cranial Nerves: No cranial nerve deficit  Motor: No abnormal muscle tone     Psychiatric:         Behavior: Behavior normal          Vital Signs  ED Triage Vitals   Temperature Pulse Respirations Blood Pressure SpO2   05/11/23 1658 05/11/23 1657 05/11/23 1657 05/11/23 1657 05/11/23 1657   99 °F (37 2 °C) 88 18 157/93 96 %      Temp src Heart Rate Source Patient Position - Orthostatic VS BP Location FiO2 (%)   -- -- 05/11/23 1657 05/11/23 1657 --     Sitting Right arm       Pain Score       05/11/23 1657       5           Vitals:    05/11/23 1657 05/11/23 1700   BP: 157/93 157/93   Pulse: 88 89   Patient Position - Orthostatic VS: Sitting          Visual Acuity      ED Medications  Medications   penicillin V potassium (VEETID) tablet 500 mg (has no administration in time range)       Diagnostic Studies  Results Reviewed     None                 No orders to display              Procedures  Procedures         ED Course                                             Medical Decision Making  Dental infection  No drainable abscess on exam   Systemically well  Poor dentition throughout  No evidence of Ludewig's angina  Oral antibiotics in ED, 7-day course at home, follow-up with dentistry, phone number provided, Tylenol/ibuprofen for pain  Work note provided  No labs or imaging clinically indicated this time  Dental infection: acute illness or injury  Risk  Prescription drug management  Disposition  Final diagnoses:   Dental infection     Time reflects when diagnosis was documented in both MDM as applicable and the Disposition within this note     Time User Action Codes Description Comment    2023  5:14 PM Myrna Bowles [K04 7] Dental infection       ED Disposition     ED Disposition   Discharge    Condition   Stable    Date/Time   Thu May 11, 2023  5:14 PM    Comment   Esvin Garcia  discharge to home/self care                 Follow-up Information     Follow up With Specialties Details Why Contact Info Additional Information    [facility]  Call today To schedule outpatient appointment Omar Garces [address] Emergency Department Emergency Medicine Go to  As needed [address] Emergency Department, [address], [place], [place], [address]          Patient's Medications   Discharge Prescriptions    PENICILLIN V POTASSIUM (VEETID) 500 MG TABLET    Take 1 tablet (500 mg total) by mouth 4 (four) times a day for 7 days       Start Date: 2023 End Date: 5/18/2023       Order Dose: 500 mg       Quantity: 28 tablet    Refills: 0       No discharge procedures on file      PDMP Review       Value Time User    PDMP Reviewed  Yes 11/6/2019  3:36 AM Ritchie Navarro PA-C          ED Provider  Electronically Signed by           Adela Dey MD  05/11/23 [phone]

## 2023-05-11 NOTE — Clinical Note
Chris Priceclair was seen and treated in our emergency department on 5/11/2023  Diagnosis:     Mohan Gibson  may return to work on return date  He may return on this date: 05/12/2023         If you have any questions or concerns, please don't hesitate to call        Sheila Padilla MD    ______________________________           _______________          _______________  [facility] Representative                              Date                                Time

## 2023-05-31 ENCOUNTER — HOSPITAL ENCOUNTER (EMERGENCY)
Facility: HOSPITAL | Age: 33
Discharge: HOME | End: 2023-05-31
Attending: EMERGENCY MEDICINE

## 2023-05-31 ENCOUNTER — APPOINTMENT (EMERGENCY)
Dept: RADIOLOGY | Facility: HOSPITAL | Age: 33
End: 2023-05-31

## 2023-05-31 VITALS
OXYGEN SATURATION: 100 % | RESPIRATION RATE: 16 BRPM | WEIGHT: 219.3 LBS | TEMPERATURE: 99.3 F | HEART RATE: 89 BPM | BODY MASS INDEX: 31.47 KG/M2 | SYSTOLIC BLOOD PRESSURE: 127 MMHG | DIASTOLIC BLOOD PRESSURE: 69 MMHG

## 2023-05-31 DIAGNOSIS — M54.50 LOW BACK PAIN: ICD-10-CM

## 2023-05-31 DIAGNOSIS — R09.81 NASAL CONGESTION: ICD-10-CM

## 2023-05-31 DIAGNOSIS — J06.9 URI (UPPER RESPIRATORY INFECTION): Primary | ICD-10-CM

## 2023-05-31 LAB
FLUAV RNA RESP QL NAA+PROBE: NEGATIVE
FLUBV RNA RESP QL NAA+PROBE: NEGATIVE
RSV RNA RESP QL NAA+PROBE: NEGATIVE
S PYO DNA THROAT QL NAA+PROBE: NOT DETECTED
SARS-COV-2 RNA RESP QL NAA+PROBE: NEGATIVE

## 2023-05-31 RX ORDER — LIDOCAINE 50 MG/G
1 PATCH TOPICAL ONCE
Status: DISCONTINUED | OUTPATIENT
Start: 2023-05-31 | End: 2023-05-31 | Stop reason: HOSPADM

## 2023-05-31 RX ORDER — ACETAMINOPHEN 325 MG/1
975 TABLET ORAL ONCE
Status: COMPLETED | OUTPATIENT
Start: 2023-05-31 | End: 2023-05-31

## 2023-05-31 RX ORDER — METHOCARBAMOL 500 MG/1
500 TABLET, FILM COATED ORAL 2 TIMES DAILY
Qty: 14 TABLET | Refills: 0 | Status: SHIPPED | OUTPATIENT
Start: 2023-05-31 | End: 2023-06-07

## 2023-05-31 RX ORDER — FLUTICASONE PROPIONATE 50 MCG
1 SPRAY, SUSPENSION (ML) NASAL DAILY
Qty: 16 G | Refills: 0 | Status: SHIPPED | OUTPATIENT
Start: 2023-05-31

## 2023-05-31 RX ORDER — METHOCARBAMOL 500 MG/1
500 TABLET, FILM COATED ORAL ONCE
Status: COMPLETED | OUTPATIENT
Start: 2023-05-31 | End: 2023-05-31

## 2023-05-31 RX ORDER — KETOROLAC TROMETHAMINE 30 MG/ML
15 INJECTION, SOLUTION INTRAMUSCULAR; INTRAVENOUS ONCE
Status: COMPLETED | OUTPATIENT
Start: 2023-05-31 | End: 2023-05-31

## 2023-05-31 RX ADMIN — LIDOCAINE 1 PATCH: 50 PATCH CUTANEOUS at 18:59

## 2023-05-31 RX ADMIN — ACETAMINOPHEN 975 MG: 325 TABLET ORAL at 18:59

## 2023-05-31 RX ADMIN — KETOROLAC TROMETHAMINE 15 MG: 30 INJECTION, SOLUTION INTRAMUSCULAR at 18:59

## 2023-05-31 RX ADMIN — METHOCARBAMOL 500 MG: 500 TABLET ORAL at 18:59

## 2023-05-31 NOTE — DISCHARGE INSTRUCTIONS
Stay well hydrated    Can take ibuprofen 400 mg every 6 hours as needed for pain  Can take Tylenol 650 mg every 4 hours as needed for pain      Return to the ER for shortness of breath, chest pain, lightheadedness, any other symptoms that concern you

## 2023-05-31 NOTE — ED PROVIDER NOTES
History  Chief Complaint   Patient presents with   • Flu Symptoms     Fever (did not take temp) body aches nasal congestion x2 days   Reports lower back pain and feels this is from the way he was sleeping     64-year-old male presents emergency department for 2 days of URI symptoms  Patient says that he has had 2 days of productive cough of green sputum, generalized myalgias, nasal congestion  He also woke up today with low back pain but says this is from sleeping in a strange position  He has had subjective fevers but has not been taking his temperature  Has also had diarrhea  Denies chest pain, SOB, nausea, vomiting, abdominal pain, headache, any other complaints  Prior to Admission Medications   Prescriptions Last Dose Informant Patient Reported? Taking?   naproxen (NAPROSYN) 250 mg tablet Not Taking  No No   Sig: Take 1 tablet (250 mg total) by mouth 2 (two) times a day with meals   Patient not taking: Reported on 5/31/2023   nicotine (NICODERM CQ) 21 mg/24 hr TD 24 hr patch Not Taking  No No   Sig: Place 1 patch on the skin daily   Patient not taking: Reported on 9/2/2019      Facility-Administered Medications: None       History reviewed  No pertinent past medical history  Past Surgical History:   Procedure Laterality Date   • INCISION AND DRAINAGE INTRA ORAL ABSCESS Right 5/27/2019    Procedure: INCISION AND DRAINAGE  (I&D) R CANINE SPACE;  Surgeon: Mary Jo Moyer DMD;  [facility];  Service: Maxillofacial   • REMOVAL OF IMPACTED TOOTH - COMPLETELY BONY N/A 5/27/2019    Procedure: EXTRACTION TEETH #1,2,7,8,10,15,16,17;  Surgeon: Mary Jo Moyer DMD;  [facility];  Service: Maxillofacial       History reviewed  No pertinent family history  I have reviewed and agree with the history as documented      E-Cigarette/Vaping   • E-Cigarette Use Current Some Day User      E-Cigarette/Vaping Substances   • Nicotine Yes    • THC Yes    • CBD No    • Flavoring Yes      Social History     Tobacco Use   • Smoking status: Every Day     Packs/day: 1 00     Types: Cigarettes   • Smokeless tobacco: Former   Vaping Use   • Vaping Use: Some days   • Substances: Nicotine, THC, Flavoring   Substance Use Topics   • Alcohol use: Yes     Comment: socially   • Drug use: Yes     Types: Marijuana       Review of Systems   Constitutional: Negative  Negative for appetite change  HENT: Positive for congestion  Eyes: Negative  Respiratory: Positive for cough  Negative for shortness of breath  Cardiovascular: Negative  Negative for chest pain and leg swelling  Gastrointestinal: Positive for diarrhea  Negative for abdominal distention, abdominal pain, nausea and vomiting  Musculoskeletal: Positive for back pain and myalgias  Negative for neck pain  Skin: Negative  Negative for rash  Neurological: Negative  Negative for light-headedness and headaches  Hematological: Negative  All other systems reviewed and are negative  Physical Exam  Physical Exam  Vitals and nursing note reviewed  Constitutional:       General: He is not in acute distress  Appearance: He is well-developed  HENT:      Head: Normocephalic and atraumatic  Nose: Congestion present  Mouth/Throat:      Mouth: Mucous membranes are moist       Pharynx: Posterior oropharyngeal erythema present  No oropharyngeal exudate  Eyes:      Pupils: Pupils are equal, round, and reactive to light  Cardiovascular:      Rate and Rhythm: Normal rate and regular rhythm  Heart sounds: Normal heart sounds  No murmur heard  No friction rub  No gallop  Pulmonary:      Effort: Pulmonary effort is normal  No respiratory distress  Breath sounds: Normal breath sounds  No stridor  No wheezing, rhonchi or rales  Abdominal:      Palpations: Abdomen is soft  Tenderness: There is no abdominal tenderness  There is no guarding or rebound     Musculoskeletal:         General: Tenderness (mild bilateral lumbar paraspinous) present  No swelling  Normal range of motion  Cervical back: Normal range of motion and neck supple  Right lower leg: No edema  Left lower leg: No edema  Comments: No midline lumbar tenderness   Skin:     General: Skin is warm and dry  Capillary Refill: Capillary refill takes less than 2 seconds  Neurological:      Mental Status: He is alert and oriented to person, place, and time  Comments: Clear fluent speech         Vital Signs  ED Triage Vitals   Temperature Pulse Respirations Blood Pressure SpO2   05/31/23 1838 05/31/23 1838 05/31/23 1838 05/31/23 1839 05/31/23 1838   99 3 °F (37 4 °C) 89 16 127/69 100 %      Temp Source Heart Rate Source Patient Position - Orthostatic VS BP Location FiO2 (%)   05/31/23 1838 05/31/23 1838 05/31/23 1838 05/31/23 1838 --   Oral Monitor Sitting Left arm       Pain Score       05/31/23 1838       5           Vitals:    05/31/23 1838 05/31/23 1839   BP:  127/69   Pulse: 89    Patient Position - Orthostatic VS: Sitting          Visual Acuity      ED Medications  Medications   lidocaine (LIDODERM) 5 % patch 1 patch (1 patch Topical Medication Applied 5/31/23 1859)   methocarbamol (ROBAXIN) tablet 500 mg (500 mg Oral Given 5/31/23 1859)   ketorolac (TORADOL) injection 15 mg (15 mg Intramuscular Given 5/31/23 1859)   acetaminophen (TYLENOL) tablet 975 mg (975 mg Oral Given 5/31/23 1859)       Diagnostic Studies  Results Reviewed     Procedure Component Value Units Date/Time    FLU/RSV/COVID - if FLU/RSV clinically relevant [[id number]]  (Normal) Collected: 05/31/23 1903    Lab Status: Final result Specimen: Nares from Nose Updated: 05/31/23 1948     SARS-CoV-2 Negative     INFLUENZA A PCR Negative     INFLUENZA B PCR Negative     RSV PCR Negative    Narrative:      FOR PEDIATRIC PATIENTS - copy/paste COVID Guidelines URL to browser: [email or web address]    SARS-CoV-2 assay is a Nucleic Acid Amplification assay intended for the  qualitative detection of nucleic acid from SARS-CoV-2 in nasopharyngeal  swabs  Results are for the presumptive identification of SARS-CoV-2 RNA  Positive results are indicative of infection with SARS-CoV-2, the virus  causing COVID-19, but do not rule out bacterial infection or co-infection  with other viruses  Laboratories within the [place] and its  territories are required to report all positive results to the appropriate  public health authorities  Negative results do not preclude SARS-CoV-2  infection and should not be used as the sole basis for treatment or other  patient management decisions  Negative results must be combined with  clinical observations, patient history, and epidemiological information  This test has not been FDA cleared or approved  This test has been authorized by FDA under an Emergency Use Authorization  (EUA)  This test is only authorized for the duration of time the  declaration that circumstances exist justifying the authorization of the  emergency use of an in vitro diagnostic tests for detection of SARS-CoV-2  virus and/or diagnosis of COVID-19 infection under section 564(b)(1) of  the Act, 21 U  S C  674QXD-0(I)(6), unless the authorization is terminated  or revoked sooner  The test has been validated but independent review by FDA  and CLIA is pending  Test performed using [employer] GeneXpert: This RT-PCR assay targets N2,  a region unique to SARS-CoV-2  A conserved region in the E-gene was chosen  for pan-Sarbecovirus detection which includes SARS-CoV-2  According to CMS-2020-01-R, this platform meets the definition of high-throughput technology      Strep A PCR [[id number]]  (Normal) Collected: 05/31/23 1903    Lab Status: Final result Specimen: Throat Updated: 05/31/23 1934     STREP A PCR Not Detected                 XR chest 2 views   ED Interpretation by Marleen Houston MD (05/31 1937)   No acute cardiopulmonary disease                 Procedures  Procedures         ED Course                               SBIRT 22yo+    Flowsheet Row Most Recent Value   Initial Alcohol Screen: US AUDIT-C     1  How often do you have a drink containing alcohol? 2 Filed at: 05/31/2023 1837   2  How many drinks containing alcohol do you have on a typical day you are drinking? 0 Filed at: 05/31/2023 1837   3a  Male UNDER 65: How often do you have five or more drinks on one occasion? 1 Filed at: 05/31/2023 1837   Audit-C Score 3 Filed at: 05/31/2023 1837   KVNG: How many times in the past year have you    Used an illegal drug or used a prescription medication for non-medical reasons? Never Filed at: 05/31/2023 1837                    Medical Decision Making  69-year-old male presents emergency department for 2 days of URI symptoms  He is overall well-appearing, nontoxic  Will obtain chest x-ray to rule out pneumonia, although have low concern for this  Also send strep and COVID/influenza  He also has low back pain that appears musculoskeletal   Will treat with analgesics  Assessment: Patient feels improved on reexam   Strep negative and chest x-ray reassuring  Strict ED return precautions provided should symptoms worsen and patient can otherwise follow up outpatient  Patient expresses an understanding and agreement with the plan and remains in good condition for discharge  Low back pain: acute illness or injury  Nasal congestion: acute illness or injury  URI (upper respiratory infection): acute illness or injury  Amount and/or Complexity of Data Reviewed  Labs: ordered  Radiology: ordered and independent interpretation performed  Risk  OTC drugs  Prescription drug management            Disposition  Final diagnoses:   URI (upper respiratory infection)   Low back pain   Nasal congestion     Time reflects when diagnosis was documented in both MDM as applicable and the Disposition within this note     Time User Action Codes Description Comment    5/31/2023  7:39 PM MinorClaire Add [J06 9] URI (upper respiratory infection)     5/31/2023  7:42 PM MinorClaire Add [M54 50] Low back pain     5/31/2023  7:44 PM Claire Snow Add [R09 81] Nasal congestion       ED Disposition     ED Disposition   Discharge    Condition   Stable    Date/Time   Wed May 31, 2023  7:39 PM    Comment   Florin Leeann  discharge to home/self care  Follow-up Information     Follow up With Specialties Details Why Contact Info Additional [address]  Emergency Department Emergency Medicine Go to  If symptoms worsen [address] [facility],[address] [address]  [address] [facility] Emergency Department, [address], [address]    [address] Family Medicine Call in 1 day  [address] [address]  [address] [place] [address], [address], [phone]          Discharge Medication List as of 5/31/2023  7:44 PM      START taking these medications    Details   fluticasone (FLONASE) 50 mcg/act nasal spray 1 spray into each nostril daily, Starting Wed 5/31/2023, Normal      methocarbamol (ROBAXIN) 500 mg tablet Take 1 tablet (500 mg total) by mouth 2 (two) times a day for 7 days, Starting Wed 5/31/2023, Until Wed 6/7/2023, Normal         CONTINUE these medications which have NOT CHANGED    Details   naproxen (NAPROSYN) 250 mg tablet Take 1 tablet (250 mg total) by mouth 2 (two) times a day with meals, Starting Sun 12/4/2022, Print      nicotine (NICODERM CQ) 21 mg/24 hr TD 24 hr patch Place 1 patch on the skin daily, Starting Thu 5/30/2019, Print             No discharge procedures on file      PDMP Review       Value Time User    PDMP Reviewed  Yes 11/6/2019  3:36 AM Olimpia De Santiago PA-C          ED Provider  Electronically Signed by           Marianne Zimmerman MD  05/31/23 0438

## 2023-05-31 NOTE — Clinical Note
Ray Chambers was seen and treated in our emergency department on 5/31/2023  Diagnosis:     Yocasta Handy    He may return on this date: 06/05/2023         If you have any questions or concerns, please don't hesitate to call        Renee Moseley MD    ______________________________           _______________          _______________  Hospital Representative                              Date                                Time

## 2023-08-21 NOTE — ED NOTES
Aurora Huffman / Yonatan Mix arrived in ER to tele-psych patient about 16:40  Patient was committed and [facility] will begin a bed search  PC placed in envelope and copy made for chart  Called Aruora Huffman / Amy Vo for an update about 19:30 - no [facility] beds could be found - they will continue the bed search and inquire about borrowing a bed from another county  Aurora Mix called and asked us to check to see if patient had active [place] medicaid - registration ran it and it came back "not eligible" - Aurora Huffman / Rhonda Velazco was notified of this  Subjective:      George Cooper is a 78 y.o. female seen for evaluation and treatment of left third digit distal phalanx of the hand pain that was injured on 04/28/2017. X-ray left hand third digit shows \"three views of the left third finger demonstrate nondisplaced fracture of the distal phalanx with soft tissue swelling. There is no dislocation or foreign body. \"  Closed. She was placed in a finger splint on 04/29/2017. She is here for a follow-up visit. She mentions improved pain. Denies loss of sensation. Denies numbness or tingling. Meds:   Current Outpatient Prescriptions   Medication Sig Dispense Refill    olmesartan-hydroCHLOROthiazide (BENICAR HCT) 40-12.5 mg per tablet Take 1 Tab by mouth daily.  metFORMIN (GLUCOPHAGE) 500 mg tablet TAKE 1 TAB BY MOUTH TWO (2) TIMES DAILY (WITH MEALS). 60 Tab 1    metoprolol succinate (TOPROL-XL) 25 mg XL tablet TAKE 1 TABLET BY MOUTH EVERY DAY 30 Tab 2       Allergies: Allergies   Allergen Reactions    Penicillins Rash and Swelling       Smoker:  History   Smoking Status    Never Smoker   Smokeless Tobacco    Not on file       ETOH:   History   Alcohol Use No       FH:   Family History   Problem Relation Age of Onset    Bleeding Prob Mother      Anyerism- Brain    Diabetes Father     Other Father      hep C    Stroke Sister     No Known Problems Brother     No Known Problems Brother     No Known Problems Brother     No Known Problems Brother     No Known Problems Brother     Lupus Sister     No Known Problems Sister     No Known Problems Sister     No Known Problems Sister     Migraines Daughter     No Known Problems Daughter        ROS: Per HPI    Objective:     Visit Vitals    /87 (BP 1 Location: Left arm, BP Patient Position: Sitting)    Pulse 71    Temp 98.4 °F (36.9 °C) (Oral)    Resp 16    Ht 5' 3\" (1.6 m)    Wt 149 lb (67.6 kg)    SpO2 99%    BMI 26.39 kg/m2      General: Alert and oriented and in no acute distress. Responds to all questions appropriately. Wrist: left  Wrist Effusion: None  Deformity: None    ROM:  Flexion: Full. Extension:  Full. Ulna deviation:  Full. Radial deviation:  Full. Palpation:  Snuff box tenderness: None  TFCC tenderness: None  Ulna styloid tenderness: None  Distal radius tenderness: None  Hook of Hamate tenderness: None  Second compartment (intersection) tenderness: None    Other test:  Finkelsteins test: Negative  Phalens test: Negative  Tinels test: Negative  Ulnar sided compression test: Negative  Maynards test: Negative    Strength (0-5/5):   Flexion:5/5  Extension: 5/5  : 5/5  Intrinsics: 5/5  EPL (extensor pollicis longus): 5/5  Pinch mechanism: 5/5    Neuro/Vascular: Pulses intact, no edema, and neurologically intact. Skin: No obvious rash. Phalanx: left  Third digit: Neurovascularly intact. UCL and RCL tenderness at DIP Joint: None. ROM: Limited active ROM. Full Passive ROM. Flexor Digitorum Profundus Avulsion (Jersey Finger): None. Mallet Finger: None. Imaging  X-ray is performedl - \"three views of the left third finger again demonstrate a nondisplaced transverse fracture involving the third distal phalanx. No new fracture or  dislocation is evident. The soft tissues are within normal limits. IMPRESSION: Nondisplaced third distal phalanx fracture, unchanged in  alignment.        Assessment/Plan:       ICD-10-CM ICD-9-CM    1. Finger injury, left, subsequent encounter S69. 92XD V58.89 XR 3RD FINGER LT MIN 2 V     959.5      -Closed. Minimally displaced. PLAN:    Brace/Splint: 3 weeks total.    Home Exercise Program/Physical Therapy: may come out of splint in one week for 15 minutes at a time and exercise hand 2x per day to avoid stiffness. Ice 15 minutes, three times a day, for 48 hours. Ice for 15 minutes after exercise. Medications:    1.  Ibuprofen (Motrin, Advil): 200mg - take 1-4 three times a day PRN   -OR-    Naproxin (Aleve): 220mg 1-2 tablets twice a day PRN  2. Acetaminophen (Tylenol): 500mg 1-2 tablets every 6 hours as needed for pain. Follow Up: 2 weeks or sooner as needed.      Shannan Lynn MD  Family Medicine/Sports Medicine Fellow PGY4 Spironolactone Counseling: Patient advised regarding risks of diarrhea, abdominal pain, hyperkalemia, birth defects (for female patients), liver toxicity and renal toxicity. The patient may need blood work to monitor liver and kidney function and potassium levels while on therapy. The patient verbalized understanding of the proper use and possible adverse effects of spironolactone.  All of the patient's questions and concerns were addressed.

## 2024-02-12 ENCOUNTER — HOSPITAL ENCOUNTER (EMERGENCY)
Facility: HOSPITAL | Age: 34
Discharge: HOME | End: 2024-02-12
Attending: EMERGENCY MEDICINE

## 2024-02-12 VITALS
SYSTOLIC BLOOD PRESSURE: 132 MMHG | HEART RATE: 75 BPM | TEMPERATURE: 98.5 F | OXYGEN SATURATION: 98 % | RESPIRATION RATE: 18 BRPM | WEIGHT: 223.77 LBS | BODY MASS INDEX: 32.04 KG/M2 | DIASTOLIC BLOOD PRESSURE: 80 MMHG | HEIGHT: 70 IN

## 2024-02-12 DIAGNOSIS — K08.89 TOOTHACHE: Primary | ICD-10-CM

## 2024-02-12 DIAGNOSIS — K02.9 DENTAL CARIES: ICD-10-CM

## 2024-02-12 DIAGNOSIS — K08.89 DENTALGIA: ICD-10-CM

## 2024-02-12 PROCEDURE — 99282 EMERGENCY DEPT VISIT SF MDM: CPT

## 2024-02-12 PROCEDURE — 99284 EMERGENCY DEPT VISIT MOD MDM: CPT | Performed by: EMERGENCY MEDICINE

## 2024-02-12 RX ORDER — ACETAMINOPHEN 325 MG/1
975 TABLET ORAL ONCE
Status: COMPLETED | OUTPATIENT
Start: 2024-02-12 | End: 2024-02-12

## 2024-02-12 RX ORDER — ACETAMINOPHEN 500 MG
1000 TABLET ORAL EVERY 8 HOURS PRN
Qty: 60 TABLET | Refills: 0 | Status: SHIPPED | OUTPATIENT
Start: 2024-02-12

## 2024-02-12 RX ORDER — AMOXICILLIN AND CLAVULANATE POTASSIUM 875; 125 MG/1; MG/1
1 TABLET, FILM COATED ORAL ONCE
Status: COMPLETED | OUTPATIENT
Start: 2024-02-12 | End: 2024-02-12

## 2024-02-12 RX ORDER — CHLORHEXIDINE GLUCONATE ORAL RINSE 1.2 MG/ML
15 SOLUTION DENTAL 2 TIMES DAILY
Qty: 120 ML | Refills: 0 | Status: SHIPPED | OUTPATIENT
Start: 2024-02-12

## 2024-02-12 RX ORDER — AMOXICILLIN AND CLAVULANATE POTASSIUM 875; 125 MG/1; MG/1
1 TABLET, FILM COATED ORAL EVERY 12 HOURS
Qty: 13 TABLET | Refills: 0 | Status: SHIPPED | OUTPATIENT
Start: 2024-02-12 | End: 2024-02-19

## 2024-02-12 RX ORDER — IBUPROFEN 600 MG/1
600 TABLET ORAL ONCE
Status: COMPLETED | OUTPATIENT
Start: 2024-02-12 | End: 2024-02-12

## 2024-02-12 RX ADMIN — AMOXICILLIN AND CLAVULANATE POTASSIUM 1 TABLET: 875; 125 TABLET, FILM COATED ORAL at 19:43

## 2024-02-12 RX ADMIN — ACETAMINOPHEN 975 MG: 325 TABLET, FILM COATED ORAL at 19:43

## 2024-02-12 RX ADMIN — IBUPROFEN 600 MG: 600 TABLET, FILM COATED ORAL at 19:43

## 2024-02-13 NOTE — ED PROVIDER NOTES
"History  Chief Complaint   Patient presents with    Dental Pain     L upper \"abscessed tooth\" x 2 days, denies fevers     34 y/o male with hx of prior dental abscess and tooth extractions presents to the ED for evaluation of left upper dental pain and slight facial swelling.  Patient states that his symptoms developed over the last 2 days.  He did not take any medications prior to arrival for symptoms.  He denies any fever, chills, sore throat, difficulty swallowing, voice change, cough, chest pain, dyspnea, abdominal pain, nausea, vomiting, or diarrhea.        Prior to Admission Medications   Prescriptions Last Dose Informant Patient Reported? Taking?   fluticasone (FLONASE) 50 mcg/act nasal spray Not Taking  No No   Si spray into each nostril daily   Patient not taking: Reported on 2024   methocarbamol (ROBAXIN) 500 mg tablet   No No   Sig: Take 1 tablet (500 mg total) by mouth 2 (two) times a day for 7 days   naproxen (NAPROSYN) 250 mg tablet Not Taking  No No   Sig: Take 1 tablet (250 mg total) by mouth 2 (two) times a day with meals   Patient not taking: Reported on 2023   nicotine (NICODERM CQ) 21 mg/24 hr TD 24 hr patch Not Taking  No No   Sig: Place 1 patch on the skin daily   Patient not taking: Reported on 2019      Facility-Administered Medications: None       History reviewed. No pertinent past medical history.    Past Surgical History:   Procedure Laterality Date    INCISION AND DRAINAGE INTRA ORAL ABSCESS Right 2019    Procedure: INCISION AND DRAINAGE  (I&D) R CANINE SPACE;  Surgeon: Christina Arias DMD;  [facility];  Service: Maxillofacial    REMOVAL OF IMPACTED TOOTH - COMPLETELY BONY N/A 2019    Procedure: EXTRACTION TEETH #1,2,7,8,10,15,16,17;  Surgeon: Christina Arias DMD;  [facility];  Service: Maxillofacial       History reviewed. No pertinent family history.  I have reviewed and agree with the history as documented.    E-Cigarette/Vaping "    E-Cigarette Use Current Some Day User      E-Cigarette/Vaping Substances    Nicotine Yes     THC Yes     CBD No     Flavoring Yes      Social History     Tobacco Use    Smoking status: Every Day     Current packs/day: 1.00     Types: Cigarettes    Smokeless tobacco: Former   Vaping Use    Vaping status: Some Days    Substances: Nicotine, THC, Flavoring   Substance Use Topics    Alcohol use: Not Currently     Comment: socially    Drug use: Yes     Types: Marijuana       Review of Systems   Constitutional:  Negative for chills and fever.   HENT:  Positive for dental problem. Negative for congestion, rhinorrhea and sore throat.    Respiratory:  Negative for cough and shortness of breath.    Cardiovascular:  Negative for chest pain and palpitations.   Gastrointestinal:  Negative for abdominal pain, diarrhea, nausea and vomiting.   Genitourinary:  Negative for dysuria and hematuria.   Musculoskeletal:  Negative for back pain and neck pain.   Neurological:  Negative for weakness, light-headedness, numbness and headaches.   All other systems reviewed and are negative.      Physical Exam  Physical Exam  Vitals and nursing note reviewed.   Constitutional:       General: He is not in acute distress.     Appearance: Normal appearance. He is normal weight.   HENT:      Head: Normocephalic and atraumatic.      Right Ear: External ear normal.      Left Ear: External ear normal.      Nose: Nose normal.      Mouth/Throat:      Mouth: Mucous membranes are moist.      Pharynx: No oropharyngeal exudate or posterior oropharyngeal erythema.      Comments: Multiple missing teeth, poor dentition and dental caries noted.  There is mild tenderness to palpation over the left upper incisor with no fluctuance or evidence of periapical abscess.  The floor the mouth is soft.  No trismus.  Normal phonation.  No stridor.  Slight left-sided maxillary facial swelling with no overlying skin changes.  Eyes:      Extraocular Movements: Extraocular  movements intact.      Conjunctiva/sclera: Conjunctivae normal.      Pupils: Pupils are equal, round, and reactive to light.   Cardiovascular:      Rate and Rhythm: Normal rate and regular rhythm.      Pulses: Normal pulses.      Heart sounds: Normal heart sounds.   Pulmonary:      Effort: Pulmonary effort is normal. No respiratory distress.      Breath sounds: Normal breath sounds. No wheezing or rales.   Abdominal:      General: Abdomen is flat. Bowel sounds are normal. There is no distension.      Palpations: Abdomen is soft.      Tenderness: There is no abdominal tenderness. There is no right CVA tenderness, left CVA tenderness or guarding.   Musculoskeletal:         General: No swelling or tenderness. Normal range of motion.      Cervical back: Normal range of motion and neck supple. No tenderness.   Skin:     General: Skin is warm and dry.   Neurological:      General: No focal deficit present.      Mental Status: He is alert and oriented to person, place, and time.         Vital Signs  ED Triage Vitals   Temperature Pulse Respirations Blood Pressure SpO2   02/12/24 1912 02/12/24 1912 02/12/24 1912 02/12/24 1914 02/12/24 1912   98.5 °F (36.9 °C) 75 18 132/80 98 %      Temp Source Heart Rate Source Patient Position - Orthostatic VS BP Location FiO2 (%)   02/12/24 1912 02/12/24 1912 02/12/24 1912 02/12/24 1912 --   Oral Monitor Sitting Left arm       Pain Score       02/12/24 1913       3           Vitals:    02/12/24 1912 02/12/24 1913 02/12/24 1914   BP:   132/80   Pulse: 75     Patient Position - Orthostatic VS: Sitting Sitting          Visual Acuity      ED Medications  Medications   amoxicillin-clavulanate (AUGMENTIN) 875-125 mg per tablet 1 tablet (1 tablet Oral Given 2/12/24 1943)   ibuprofen (MOTRIN) tablet 600 mg (600 mg Oral Given 2/12/24 1943)   acetaminophen (TYLENOL) tablet 975 mg (975 mg Oral Given 2/12/24 1943)       Diagnostic Studies  Results Reviewed       None                   No orders to  display              Procedures  Procedures         ED Course                                             Medical Decision Making  34 y/o male with hx of prior dental abscess and tooth extractions presents to the ED for evaluation of left upper dental pain and slight facial swelling.  Patient states that his symptoms developed over the last 2 days.  He did not take any medications prior to arrival for symptoms.  He denies any fever, chills, sore throat, difficulty swallowing, voice change, cough, chest pain, dyspnea, abdominal pain, nausea, vomiting, or diarrhea.    Vital signs reviewed.  See physical exam documentation for exam findings.  Differential diagnosis includes but is not limited to dental caries, dentalgia, and/or early dental infection.  Will treat with antibiotics and analgesic medication and refer to dental clinic for follow-up. I discussed all findings, treatment, red flags/return precautions, and outpatient follow-up and the patient/family understand and agree. Stable for discharge.               Disposition  Final diagnoses:   Toothache   Dentalgia   Dental caries     Time reflects when diagnosis was documented in both MDM as applicable and the Disposition within this note       Time User Action Codes Description Comment    2/12/2024  7:52 PM Kevin Almanza [K08.89] Toothache     2/12/2024  7:52 PM Kevin Almanza [K08.89] Dentalgia     2/12/2024  7:52 PM Kevin Almanza [K02.9] Dental caries           ED Disposition       ED Disposition   Discharge    Condition   Stable    Date/Time   Mon Feb 12, 2024  7:52 PM    Comment   Bernardo Barksdale Jr. discharge to home/self care.                   Follow-up Information       Follow up With Specialties Details Why Contact Info Additional Information    [facility] Call in 1 day For follow-up [address] [place] [id number] [phone] [facility], [address] [place] [address],  [address], [phone]    [facility] Emergency Department Emergency Medicine Go to  If symptoms worsen [address] [id number] [phone] [facility] Emergency Department, [address] [place] [address]            Discharge Medication List as of 2/12/2024  7:55 PM        START taking these medications    Details   acetaminophen (TYLENOL) 500 mg tablet Take 2 tablets (1,000 mg total) by mouth every 8 (eight) hours as needed for mild pain, Starting Mon 2/12/2024, Normal      amoxicillin-clavulanate (AUGMENTIN) 875-125 mg per tablet Take 1 tablet by mouth every 12 (twelve) hours for 7 days, Starting Mon 2/12/2024, Until Mon 2/19/2024, Normal      chlorhexidine (PERIDEX) 0.12 % solution Apply 15 mL to the mouth or throat 2 (two) times a day, Starting Mon 2/12/2024, Normal           CONTINUE these medications which have NOT CHANGED    Details   fluticasone (FLONASE) 50 mcg/act nasal spray 1 spray into each nostril daily, Starting Wed 5/31/2023, Normal      methocarbamol (ROBAXIN) 500 mg tablet Take 1 tablet (500 mg total) by mouth 2 (two) times a day for 7 days, Starting Wed 5/31/2023, Until Wed 6/7/2023, Normal      naproxen (NAPROSYN) 250 mg tablet Take 1 tablet (250 mg total) by mouth 2 (two) times a day with meals, Starting Sun 12/4/2022, Print      nicotine (NICODERM CQ) 21 mg/24 hr TD 24 hr patch Place 1 patch on the skin daily, Starting Thu 5/30/2019, Print                 PDMP Review         Value Time User    PDMP Reviewed  Yes 11/6/2019  3:36 AM Navi Merlos PA-C            ED Provider  Electronically Signed by             Kevin Almanza MD  02/12/24 2014

## 2024-02-13 NOTE — DISCHARGE INSTRUCTIONS
Please use the following pain medications as prescribed:  - Tylenol 650mg every 6 hours  - Motrin 400mg every 6 hours  - Orajel OTC  They work in different ways so can be used together at the same time.     We will give a script for peridex wash. This is an anti-septic which will sterilize the area and prevent and infection from occurring. This can only be used for 2-3 weeks. If you use it longer, it might cause some pink staining of your teeth. It works like a super powered form of Listerine.     Lastly, I am starting you on antibiotics. I would like you to take the full course as prescribed. This should help the pain and swelling.     If despite the above you have worsening symptoms please return to the emergency room for re-evaluation.     If you HAVE A DENTIST, please call to make an appointment with them for follow-up as soon as possible.  If you DO NOT have a dentist, please call [phone]. It is a service that will help you find a dentist in your area and based on your insurance (if you have it or not) after you answer some questions    You may also follow-up with the [facility] if you don't have a density.   Dental Clinic - [facility]  [address] [place] [address]  [phone]  They will see you with or without insurance and have walk in hours in the morning without appointments.     Other local dental clinics include:   DENTAL CLINICS    [facility]  [address] [place] [address]  [phone]  Walk in house M-F 8a-noon  Emergency Dental Care    [facility]  [address], 2nd [place] [address]  [phone]    Dr. Xavier Monroy  [address] [place] [address]  Takes adults & children on a waiting list    [facility]  [address] [place] [address]  [phone]  Walk in scheduled only M-F 8a-noon & 1p-4p  Uninsured received  40% discount & payments  Payment must be made upfront before the service  Emergency Dental Care    [facility]  [address] [facility]  [place] [address]  OR  [address] [place] [address]  [phone]    [facility]  [facility]  [phone]

## 2024-03-30 ENCOUNTER — HOSPITAL ENCOUNTER (EMERGENCY)
Facility: HOSPITAL | Age: 34
Discharge: HOME | End: 2024-03-31
Attending: EMERGENCY MEDICINE | Admitting: EMERGENCY MEDICINE

## 2024-03-30 ENCOUNTER — APPOINTMENT (EMERGENCY)
Dept: RADIOLOGY | Facility: HOSPITAL | Age: 34
End: 2024-03-30

## 2024-03-30 DIAGNOSIS — R07.9 ACUTE CHEST PAIN: Primary | ICD-10-CM

## 2024-03-30 LAB
BASOPHILS # BLD AUTO: 0.03 THOUSANDS/ÂΜL (ref 0–0.1)
BASOPHILS NFR BLD AUTO: 1 % (ref 0–1)
EOSINOPHIL # BLD AUTO: 0.02 THOUSAND/ÂΜL (ref 0–0.61)
EOSINOPHIL NFR BLD AUTO: 0 % (ref 0–6)
ERYTHROCYTE [DISTWIDTH] IN BLOOD BY AUTOMATED COUNT: 11.9 % (ref 11.6–15.1)
HCT VFR BLD AUTO: 43.7 % (ref 36.5–49.3)
HGB BLD-MCNC: 15.3 G/DL (ref 12–17)
IMM GRANULOCYTES # BLD AUTO: 0.01 THOUSAND/UL (ref 0–0.2)
IMM GRANULOCYTES NFR BLD AUTO: 0 % (ref 0–2)
LYMPHOCYTES # BLD AUTO: 1.03 THOUSANDS/ÂΜL (ref 0.6–4.47)
LYMPHOCYTES NFR BLD AUTO: 21 % (ref 14–44)
MCH RBC QN AUTO: 31.9 PG (ref 26.8–34.3)
MCHC RBC AUTO-ENTMCNC: 35 G/DL (ref 31.4–37.4)
MCV RBC AUTO: 91 FL (ref 82–98)
MONOCYTES # BLD AUTO: 0.45 THOUSAND/ÂΜL (ref 0.17–1.22)
MONOCYTES NFR BLD AUTO: 9 % (ref 4–12)
NEUTROPHILS # BLD AUTO: 3.43 THOUSANDS/ÂΜL (ref 1.85–7.62)
NEUTS SEG NFR BLD AUTO: 69 % (ref 43–75)
NRBC BLD AUTO-RTO: 0 /100 WBCS
PLATELET # BLD AUTO: 131 THOUSANDS/UL (ref 149–390)
PMV BLD AUTO: 12.3 FL (ref 8.9–12.7)
RBC # BLD AUTO: 4.79 MILLION/UL (ref 3.88–5.62)
WBC # BLD AUTO: 4.97 THOUSAND/UL (ref 4.31–10.16)

## 2024-03-30 PROCEDURE — 80053 COMPREHEN METABOLIC PANEL: CPT

## 2024-03-30 PROCEDURE — 84484 ASSAY OF TROPONIN QUANT: CPT

## 2024-03-30 PROCEDURE — 36415 COLL VENOUS BLD VENIPUNCTURE: CPT

## 2024-03-30 PROCEDURE — 99285 EMERGENCY DEPT VISIT HI MDM: CPT

## 2024-03-30 PROCEDURE — 85025 COMPLETE CBC W/AUTO DIFF WBC: CPT

## 2024-03-30 PROCEDURE — 93005 ELECTROCARDIOGRAM TRACING: CPT

## 2024-03-30 PROCEDURE — 71046 X-RAY EXAM CHEST 2 VIEWS: CPT

## 2024-03-30 PROCEDURE — 83880 ASSAY OF NATRIURETIC PEPTIDE: CPT | Performed by: EMERGENCY MEDICINE

## 2024-03-30 PROCEDURE — 99285 EMERGENCY DEPT VISIT HI MDM: CPT | Performed by: EMERGENCY MEDICINE

## 2024-03-30 RX ORDER — ASPIRIN 81 MG/1
324 TABLET, CHEWABLE ORAL ONCE
Status: DISCONTINUED | OUTPATIENT
Start: 2024-03-31 | End: 2024-03-31

## 2024-03-30 NOTE — Clinical Note
Bernardo Barksdale was seen and treated in our emergency department on 3/30/2024.    No restrictions            Diagnosis:     Bernardo  may return to work on return date.    He may return on this date: 04/01/2024         If you have any questions or concerns, please don't hesitate to call.      Bert Paiz MD    ______________________________           _______________          _______________  Hospital Representative                              Date                                Time

## 2024-03-31 VITALS
SYSTOLIC BLOOD PRESSURE: 150 MMHG | OXYGEN SATURATION: 96 % | RESPIRATION RATE: 12 BRPM | DIASTOLIC BLOOD PRESSURE: 75 MMHG | HEART RATE: 81 BPM | TEMPERATURE: 98.4 F

## 2024-03-31 LAB
ALBUMIN SERPL BCP-MCNC: 4.4 G/DL (ref 3.5–5)
ALP SERPL-CCNC: 87 U/L (ref 34–104)
ALT SERPL W P-5'-P-CCNC: 58 U/L (ref 7–52)
ANION GAP SERPL CALCULATED.3IONS-SCNC: 11 MMOL/L (ref 4–13)
AST SERPL W P-5'-P-CCNC: 34 U/L (ref 13–39)
ATRIAL RATE: 89 BPM
BILIRUB SERPL-MCNC: 0.83 MG/DL (ref 0.2–1)
BNP SERPL-MCNC: 7 PG/ML (ref 0–100)
BUN SERPL-MCNC: 12 MG/DL (ref 5–25)
CALCIUM SERPL-MCNC: 9.1 MG/DL (ref 8.4–10.2)
CARDIAC TROPONIN I PNL SERPL HS: 2 NG/L
CHLORIDE SERPL-SCNC: 101 MMOL/L (ref 96–108)
CO2 SERPL-SCNC: 26 MMOL/L (ref 21–32)
CREAT SERPL-MCNC: 1.23 MG/DL (ref 0.6–1.3)
FLUAV RNA RESP QL NAA+PROBE: NEGATIVE
FLUBV RNA RESP QL NAA+PROBE: NEGATIVE
GFR SERPL CREATININE-BSD FRML MDRD: 77 ML/MIN/1.73SQ M
GLUCOSE SERPL-MCNC: 131 MG/DL (ref 65–140)
P AXIS: 25 DEGREES
POTASSIUM SERPL-SCNC: 3.6 MMOL/L (ref 3.5–5.3)
PR INTERVAL: 144 MS
PROT SERPL-MCNC: 7.2 G/DL (ref 6.4–8.4)
QRS AXIS: 64 DEGREES
QRSD INTERVAL: 144 MS
QT INTERVAL: 400 MS
QTC INTERVAL: 486 MS
RSV RNA RESP QL NAA+PROBE: NEGATIVE
SARS-COV-2 RNA RESP QL NAA+PROBE: NEGATIVE
SODIUM SERPL-SCNC: 138 MMOL/L (ref 135–147)
T WAVE AXIS: 24 DEGREES
VENTRICULAR RATE: 89 BPM

## 2024-03-31 PROCEDURE — 0241U HB NFCT DS VIR RESP RNA 4 TRGT: CPT | Performed by: EMERGENCY MEDICINE

## 2024-03-31 PROCEDURE — 93010 ELECTROCARDIOGRAM REPORT: CPT | Performed by: INTERNAL MEDICINE

## 2024-03-31 RX ORDER — ASPIRIN 81 MG/1
243 TABLET, CHEWABLE ORAL ONCE
Status: COMPLETED | OUTPATIENT
Start: 2024-03-31 | End: 2024-03-31

## 2024-03-31 RX ADMIN — ASPIRIN 81 MG 243 MG: 81 TABLET ORAL at 01:01

## 2024-03-31 NOTE — ED PROVIDER NOTES
History  Chief Complaint   Patient presents with    Chest Pain     Patient arrives to the Ed with complain of chest pain that radiates to the right shoulder, chest tightness and nausea. Patient states last night at 4 a his left side became numb but returned to baseline soon after. Patient states the chest pain comes and goes.      33-year-old male presents with chest pain that he describes left-sided chest pain that does not radiate, starting last night, reports he has had associated headaches, epigastric pain, nausea, paresthesias in the left side of his chest, patient denies any fever, cough, he does endorse shortness of breath on exertion, vomiting, constipation or diarrhea, dysuria, hematuria, and no other complaints.  The patient has a history of cocaine and meth use, poor dentition, right bundle branch block, takes no medications, has no allergies, has had dental surgery, otherwise no other surgeries, drinks occasionally, smokes three quarters of a pack of cigarettes a day, uses marijuana, but no other drugs currently.        Prior to Admission Medications   Prescriptions Last Dose Informant Patient Reported? Taking?   acetaminophen (TYLENOL) 500 mg tablet   No No   Sig: Take 2 tablets (1,000 mg total) by mouth every 8 (eight) hours as needed for mild pain   chlorhexidine (PERIDEX) 0.12 % solution   No No   Sig: Apply 15 mL to the mouth or throat 2 (two) times a day   fluticasone (FLONASE) 50 mcg/act nasal spray   No No   Si spray into each nostril daily   Patient not taking: Reported on 2024   methocarbamol (ROBAXIN) 500 mg tablet   No No   Sig: Take 1 tablet (500 mg total) by mouth 2 (two) times a day for 7 days   naproxen (NAPROSYN) 250 mg tablet   No No   Sig: Take 1 tablet (250 mg total) by mouth 2 (two) times a day with meals   Patient not taking: Reported on 2023   nicotine (NICODERM CQ) 21 mg/24 hr TD 24 hr patch   No No   Sig: Place 1 patch on the skin daily   Patient not taking:  Reported on 9/2/2019      Facility-Administered Medications: None       History reviewed. No pertinent past medical history.    Past Surgical History:   Procedure Laterality Date    INCISION AND DRAINAGE INTRA ORAL ABSCESS Right 5/27/2019    Procedure: INCISION AND DRAINAGE  (I&D) R CANINE SPACE;  Surgeon: Christina Arias DMD;  [facility];  Service: Maxillofacial    REMOVAL OF IMPACTED TOOTH - COMPLETELY BONY N/A 5/27/2019    Procedure: EXTRACTION TEETH #1,2,7,8,10,15,16,17;  Surgeon: Christina Arias DMD;  [facility];  Service: Maxillofacial       History reviewed. No pertinent family history.  I have reviewed and agree with the history as documented.    E-Cigarette/Vaping    E-Cigarette Use Current Some Day User      E-Cigarette/Vaping Substances    Nicotine Yes     THC Yes     CBD No     Flavoring Yes      Social History     Tobacco Use    Smoking status: Every Day     Current packs/day: 1.00     Types: Cigarettes    Smokeless tobacco: Former   Vaping Use    Vaping status: Some Days    Substances: Nicotine, THC, Flavoring   Substance Use Topics    Alcohol use: Not Currently     Comment: socially    Drug use: Yes     Types: Marijuana       Review of Systems   Constitutional:  Negative for fever.   HENT:  Negative for congestion.    Eyes:  Negative for visual disturbance.   Respiratory:  Positive for shortness of breath. Negative for cough.    Cardiovascular:  Positive for chest pain.   Gastrointestinal:  Positive for abdominal pain and nausea. Negative for diarrhea and vomiting.   Endocrine: Negative for polyuria.   Genitourinary:  Negative for dysuria and hematuria.   Musculoskeletal:  Negative for myalgias.   Neurological:  Positive for headaches. Negative for dizziness.       Physical Exam  Physical Exam  Vitals and nursing note reviewed.   Constitutional:       General: He is not in acute distress.     Appearance: Normal appearance. He is obese.   HENT:      Head: Normocephalic and  atraumatic.      Right Ear: External ear normal.      Left Ear: External ear normal.      Mouth/Throat:      Mouth: Mucous membranes are moist.      Pharynx: Oropharynx is clear.   Eyes:      Conjunctiva/sclera: Conjunctivae normal.      Pupils: Pupils are equal, round, and reactive to light.   Cardiovascular:      Rate and Rhythm: Normal rate and regular rhythm.      Heart sounds: Normal heart sounds.   Pulmonary:      Effort: Pulmonary effort is normal. No respiratory distress.      Breath sounds: Normal breath sounds.   Abdominal:      General: Abdomen is flat. There is no distension.      Palpations: Abdomen is soft.      Tenderness: There is no abdominal tenderness.   Musculoskeletal:         General: No deformity. Normal range of motion.      Cervical back: Normal range of motion.   Skin:     General: Skin is warm and dry.   Neurological:      General: No focal deficit present.      Mental Status: He is alert and oriented to person, place, and time.   Psychiatric:         Mood and Affect: Mood normal.         Behavior: Behavior normal.         Vital Signs  ED Triage Vitals [03/30/24 2330]   Temperature Pulse Respirations Blood Pressure SpO2   98.4 °F (36.9 °C) 92 22 133/62 98 %      Temp Source Heart Rate Source Patient Position - Orthostatic VS BP Location FiO2 (%)   Oral Monitor Lying Left arm --      Pain Score       8           Vitals:    03/30/24 2330 03/31/24 0015   BP: 133/62 150/75   Pulse: 92 81   Patient Position - Orthostatic VS: Lying          Visual Acuity      ED Medications  Medications   aspirin chewable tablet 324 mg (has no administration in time range)       Diagnostic Studies  Results Reviewed       Procedure Component Value Units Date/Time    B-Type Natriuretic Peptide(BNP) [[id number]]  (Normal) Collected: 03/30/24 2336    Lab Status: Final result Specimen: Blood from Arm, Right Updated: 03/31/24 0043     BNP 7 pg/mL     FLU/RSV/COVID - if FLU/RSV clinically relevant [[id number]] Collected:  03/31/24 0019    Lab Status: In process Specimen: Nares from Nose Updated: 03/31/24 0023    HS Troponin 0hr (reflex protocol) [[id number]]  (Normal) Collected: 03/30/24 2336    Lab Status: Final result Specimen: Blood from Arm, Right Updated: 03/31/24 0008     hs TnI 0hr 2 ng/L     Comprehensive metabolic panel [[id number]]  (Abnormal) Collected: 03/30/24 2336    Lab Status: Final result Specimen: Blood from Arm, Right Updated: 03/31/24 0000     Sodium 138 mmol/L      Potassium 3.6 mmol/L      Chloride 101 mmol/L      CO2 26 mmol/L      ANION GAP 11 mmol/L      BUN 12 mg/dL      Creatinine 1.23 mg/dL      Glucose 131 mg/dL      Calcium 9.1 mg/dL      AST 34 U/L      ALT 58 U/L      Alkaline Phosphatase 87 U/L      Total Protein 7.2 g/dL      Albumin 4.4 g/dL      Total Bilirubin 0.83 mg/dL      eGFR 77 ml/min/1.73sq m     Narrative:      National Kidney Disease Foundation guidelines for Chronic Kidney Disease (CKD):     Stage 1 with normal or high GFR (GFR > 90 mL/min/1.73 square meters)    Stage 2 Mild CKD (GFR = 60-89 mL/min/1.73 square meters)    Stage 3A Moderate CKD (GFR = 45-59 mL/min/1.73 square meters)    Stage 3B Moderate CKD (GFR = 30-44 mL/min/1.73 square meters)    Stage 4 Severe CKD (GFR = 15-29 mL/min/1.73 square meters)    Stage 5 End Stage CKD (GFR <15 mL/min/1.73 square meters)  Note: GFR calculation is accurate only with a steady state creatinine    CBC and differential [[id number]]  (Abnormal) Collected: 03/30/24 2336    Lab Status: Final result Specimen: Blood from Arm, Right Updated: 03/30/24 2350     WBC 4.97 Thousand/uL      RBC 4.79 Million/uL      Hemoglobin 15.3 g/dL      Hematocrit 43.7 %      MCV 91 fL      MCH 31.9 pg      MCHC 35.0 g/dL      RDW 11.9 %      MPV 12.3 fL      Platelets 131 Thousands/uL      nRBC 0 /100 WBCs      Neutrophils Relative 69 %      Immature Grans % 0 %      Lymphocytes Relative 21 %      Monocytes Relative 9 %      Eosinophils Relative 0 %      Basophils Relative  1 %      Neutrophils Absolute 3.43 Thousands/µL      Absolute Immature Grans 0.01 Thousand/uL      Absolute Lymphocytes 1.03 Thousands/µL      Absolute Monocytes 0.45 Thousand/µL      Eosinophils Absolute 0.02 Thousand/µL      Basophils Absolute 0.03 Thousands/µL                    XR chest 2 views    (Results Pending)              Procedures  ECG 12 Lead Documentation Only    Date/Time: 3/30/2024 11:51 PM    Performed by: Bert Paiz MD  Authorized by: Bert Paiz MD    ECG reviewed by me, the ED Provider: yes    Patient location:  ED  Previous ECG:     Previous ECG:  Compared to current    Similarity:  No change  Interpretation:     Interpretation: abnormal    Rate:     ECG rate:  89    ECG rate assessment: normal    Rhythm:     Rhythm: sinus rhythm    Ectopy:     Ectopy: none    QRS:     QRS axis:  Normal    QRS intervals:  Wide  Conduction:     Conduction: abnormal      Abnormal conduction: complete RBBB    ST segments:     ST segments:  Normal  T waves:     T waves: normal             ED Course         Medical Decision Making  33-year-old male with chest pain, headache, nausea, shortness of breath, will be evaluated for ACS, pneumonia, arrhythmia, if his workup is unremarkable he will be discharged home will be encouraged to follow-up with cardiology as an outpatient.    Patient's workup is unremarkable, chest x-ray on my wet read is unremarkable, therefore the patient is safe for discharge with close follow-up with cardiology with return indications given.    Amount and/or Complexity of Data Reviewed  Labs: ordered.  Radiology: ordered.    Risk  OTC drugs.             Disposition  Final diagnoses:   Acute chest pain     Time reflects when diagnosis was documented in both MDM as applicable and the Disposition within this note       Time User Action Codes Description Comment    3/31/2024 12:55 AM Bert Paiz Add [R07.9] Acute chest pain           ED Disposition       ED  Disposition   Discharge    Condition   Stable    Date/Time   Sun Mar 31, 2024 12:55 AM    Comment   Bernardo Barksdale Jr. discharge to home/self care.                   Follow-up Information       Follow up With Specialties Details Why Contact Info Additional Information    [facility] Emergency Department Emergency Medicine Go to  As needed [address] [address]  [phone] [facility] Emergency Department, [address] [address]    [facility] Schedule an appointment as soon as possible for a visit in 3 days For follow-up [address] [facility] [address]  [phone] [facility], [address] [place], [place], [address]   [phone]    [facility] Schedule an appointment as soon as possible for a visit in 3 days For follow-up [address] [address]  [phone] [facility], [address] [facility] [address], [place], [place], [address]   [phone]            Patient's Medications   Discharge Prescriptions    No medications on file           PDMP Review         Value Time User    PDMP Reviewed  Yes 11/6/2019  3:36 AM Navi Merlos PA-C            ED Provider  Electronically Signed by             Bert Paiz MD  03/31/24 0057

## 2024-04-09 ENCOUNTER — APPOINTMENT (EMERGENCY)
Dept: CT IMAGING | Facility: HOSPITAL | Age: 34
End: 2024-04-09

## 2024-04-09 ENCOUNTER — HOSPITAL ENCOUNTER (EMERGENCY)
Facility: HOSPITAL | Age: 34
Discharge: HOME | End: 2024-04-09
Attending: EMERGENCY MEDICINE

## 2024-04-09 VITALS
TEMPERATURE: 97.9 F | OXYGEN SATURATION: 96 % | RESPIRATION RATE: 16 BRPM | DIASTOLIC BLOOD PRESSURE: 77 MMHG | HEART RATE: 78 BPM | SYSTOLIC BLOOD PRESSURE: 141 MMHG

## 2024-04-09 DIAGNOSIS — L03.211 FACIAL CELLULITIS: Primary | ICD-10-CM

## 2024-04-09 DIAGNOSIS — K04.7 DENTAL ABSCESS: ICD-10-CM

## 2024-04-09 LAB
ALBUMIN SERPL BCP-MCNC: 4.2 G/DL (ref 3.5–5)
ALP SERPL-CCNC: 78 U/L (ref 34–104)
ALT SERPL W P-5'-P-CCNC: 40 U/L (ref 7–52)
ANION GAP SERPL CALCULATED.3IONS-SCNC: 8 MMOL/L (ref 4–13)
AST SERPL W P-5'-P-CCNC: 15 U/L (ref 13–39)
BASOPHILS # BLD AUTO: 0.07 THOUSANDS/ÂΜL (ref 0–0.1)
BASOPHILS NFR BLD AUTO: 1 % (ref 0–1)
BILIRUB SERPL-MCNC: 0.67 MG/DL (ref 0.2–1)
BUN SERPL-MCNC: 10 MG/DL (ref 5–25)
CALCIUM SERPL-MCNC: 9.2 MG/DL (ref 8.4–10.2)
CHLORIDE SERPL-SCNC: 105 MMOL/L (ref 96–108)
CO2 SERPL-SCNC: 27 MMOL/L (ref 21–32)
CREAT SERPL-MCNC: 1 MG/DL (ref 0.6–1.3)
EOSINOPHIL # BLD AUTO: 0.21 THOUSAND/ÂΜL (ref 0–0.61)
EOSINOPHIL NFR BLD AUTO: 2 % (ref 0–6)
ERYTHROCYTE [DISTWIDTH] IN BLOOD BY AUTOMATED COUNT: 12.3 % (ref 11.6–15.1)
GFR SERPL CREATININE-BSD FRML MDRD: 99 ML/MIN/1.73SQ M
GLUCOSE SERPL-MCNC: 114 MG/DL (ref 65–140)
HCT VFR BLD AUTO: 43 % (ref 36.5–49.3)
HGB BLD-MCNC: 14.9 G/DL (ref 12–17)
IMM GRANULOCYTES # BLD AUTO: 0.03 THOUSAND/UL (ref 0–0.2)
IMM GRANULOCYTES NFR BLD AUTO: 0 % (ref 0–2)
LYMPHOCYTES # BLD AUTO: 1.94 THOUSANDS/ÂΜL (ref 0.6–4.47)
LYMPHOCYTES NFR BLD AUTO: 22 % (ref 14–44)
MCH RBC QN AUTO: 32.1 PG (ref 26.8–34.3)
MCHC RBC AUTO-ENTMCNC: 34.7 G/DL (ref 31.4–37.4)
MCV RBC AUTO: 93 FL (ref 82–98)
MONOCYTES # BLD AUTO: 0.74 THOUSAND/ÂΜL (ref 0.17–1.22)
MONOCYTES NFR BLD AUTO: 8 % (ref 4–12)
NEUTROPHILS # BLD AUTO: 5.95 THOUSANDS/ÂΜL (ref 1.85–7.62)
NEUTS SEG NFR BLD AUTO: 67 % (ref 43–75)
NRBC BLD AUTO-RTO: 0 /100 WBCS
PLATELET # BLD AUTO: 208 THOUSANDS/UL (ref 149–390)
PMV BLD AUTO: 11.9 FL (ref 8.9–12.7)
POTASSIUM SERPL-SCNC: 4.1 MMOL/L (ref 3.5–5.3)
PROCALCITONIN SERPL-MCNC: 0.05 NG/ML
PROT SERPL-MCNC: 7.2 G/DL (ref 6.4–8.4)
RBC # BLD AUTO: 4.64 MILLION/UL (ref 3.88–5.62)
SODIUM SERPL-SCNC: 140 MMOL/L (ref 135–147)
WBC # BLD AUTO: 8.94 THOUSAND/UL (ref 4.31–10.16)

## 2024-04-09 PROCEDURE — 80053 COMPREHEN METABOLIC PANEL: CPT | Performed by: PHYSICIAN ASSISTANT

## 2024-04-09 PROCEDURE — 84145 PROCALCITONIN (PCT): CPT | Performed by: PHYSICIAN ASSISTANT

## 2024-04-09 PROCEDURE — 85025 COMPLETE CBC W/AUTO DIFF WBC: CPT | Performed by: PHYSICIAN ASSISTANT

## 2024-04-09 PROCEDURE — 70487 CT MAXILLOFACIAL W/DYE: CPT

## 2024-04-09 PROCEDURE — 87040 BLOOD CULTURE FOR BACTERIA: CPT | Performed by: PHYSICIAN ASSISTANT

## 2024-04-09 PROCEDURE — 36415 COLL VENOUS BLD VENIPUNCTURE: CPT | Performed by: PHYSICIAN ASSISTANT

## 2024-04-09 RX ORDER — KETOROLAC TROMETHAMINE 30 MG/ML
15 INJECTION, SOLUTION INTRAMUSCULAR; INTRAVENOUS ONCE
Status: COMPLETED | OUTPATIENT
Start: 2024-04-09 | End: 2024-04-09

## 2024-04-09 RX ORDER — CLINDAMYCIN HYDROCHLORIDE 150 MG/1
450 CAPSULE ORAL EVERY 6 HOURS
Qty: 60 CAPSULE | Refills: 0 | Status: SHIPPED | OUTPATIENT
Start: 2024-04-09 | End: 2024-04-14

## 2024-04-09 RX ADMIN — AMPICILLIN SODIUM AND SULBACTAM SODIUM 3 G: 2; 1 INJECTION, POWDER, FOR SOLUTION INTRAMUSCULAR; INTRAVENOUS at 16:04

## 2024-04-09 RX ADMIN — KETOROLAC TROMETHAMINE 15 MG: 30 INJECTION, SOLUTION INTRAMUSCULAR at 13:29

## 2024-04-09 RX ADMIN — IOHEXOL 100 ML: 350 INJECTION, SOLUTION INTRAVENOUS at 14:14

## 2024-04-09 NOTE — Clinical Note
Bernardo Barksdale was seen and treated in our emergency department on 4/9/2024.                Diagnosis:     Bernadro  is off the rest of the shift today.    He may return on this date:     Please excuse this individual on April 9, 2024.     If you have any questions or concerns, please don't hesitate to call.      Naveen Willams PA-C    ______________________________           _______________          _______________  Hospital Representative                              Date                                Time

## 2024-04-09 NOTE — DISCHARGE INSTRUCTIONS
Please return to the emergency department for worsening symptoms including chest pain, shortness of breath, dizziness, lightheadedness, fever greater than 103, severe pain, inability to walk, fainting episodes, etc..  Please follow-up with your family practice provider as soon as possible.  I have sent medications over to the pharmacy for your symptoms.  Please take as directed.  I spoke with the oral maxillofacial surgeon while here in the emergency department.  He would like you to call his office tomorrow to make an urgent appointment to be seen in the office tomorrow in [place].  If it anytime you have difficulty opening your mouth, swallowing, or breathing, please return to the emergency department for an urgent evaluation.

## 2024-04-09 NOTE — Clinical Note
Bernardo Barksdale was seen and treated in our emergency department on 4/9/2024.                Diagnosis:     Bernardo  is off the rest of the shift today.    He may return on this date:     Please excuse this individual on April 10, 2024.     If you have any questions or concerns, please don't hesitate to call.      Naveen Willams PA-C    ______________________________           _______________          _______________  Hospital Representative                              Date                                Time

## 2024-04-10 NOTE — ED PROVIDER NOTES
History  Chief Complaint   Patient presents with    Facial Swelling     Here for eval of L sided facial swelling that patient woke up with this morning -- pt states he has had ongoing L upper sided dental issues and has had abscess in the past that required hospitalization. Denies known fever.     33-year-old male with past medical history significant for poor dentition requiring hospitalization presenting to the emergency department today for left-sided facial swelling.  The patient notes pain to his left upper dentition.  The patient notes when he woke up this morning he had significant left-sided facial swelling.  He notes pain to this area.  He denies any pain to the eye or pain with extraocular motion.  He denies any fevers or chills.  He is still able to open and close his mouth.  He is still able to swallow.  He denies any nausea, vomiting, diarrhea, or constipation.  He has no chest pain or shortness of breath.  Has not followed up recently with a dentist.  The patient denies other complaints at this time.      History provided by:  Patient   used: No    Dental Pain  Location:  Upper  Quality:  Aching  Severity:  Severe  Onset quality:  Gradual  Duration: since this AM.  Timing:  Constant  Progression:  Worsening  Chronicity:  New  Context: abscess    Relieved by:  Nothing  Worsened by:  Nothing  Ineffective treatments:  None tried  Associated symptoms: facial pain and facial swelling    Associated symptoms: no congestion, no difficulty swallowing, no drooling, no fever, no gum swelling, no headaches, no neck pain, no neck swelling, no oral bleeding, no oral lesions and no trismus    Risk factors: lack of dental care and periodontal disease        Prior to Admission Medications   Prescriptions Last Dose Informant Patient Reported? Taking?   acetaminophen (TYLENOL) 500 mg tablet   No No   Sig: Take 2 tablets (1,000 mg total) by mouth every 8 (eight) hours as needed for mild pain    chlorhexidine (PERIDEX) 0.12 % solution   No No   Sig: Apply 15 mL to the mouth or throat 2 (two) times a day   fluticasone (FLONASE) 50 mcg/act nasal spray   No No   Si spray into each nostril daily   Patient not taking: Reported on 2024   methocarbamol (ROBAXIN) 500 mg tablet   No No   Sig: Take 1 tablet (500 mg total) by mouth 2 (two) times a day for 7 days   naproxen (NAPROSYN) 250 mg tablet   No No   Sig: Take 1 tablet (250 mg total) by mouth 2 (two) times a day with meals   Patient not taking: Reported on 2023   nicotine (NICODERM CQ) 21 mg/24 hr TD 24 hr patch   No No   Sig: Place 1 patch on the skin daily   Patient not taking: Reported on 2019      Facility-Administered Medications: None       History reviewed. No pertinent past medical history.    Past Surgical History:   Procedure Laterality Date    INCISION AND DRAINAGE INTRA ORAL ABSCESS Right 2019    Procedure: INCISION AND DRAINAGE  (I&D) R CANINE SPACE;  Surgeon: Christina Arias DMD;  [facility];  Service: Maxillofacial    REMOVAL OF IMPACTED TOOTH - COMPLETELY BONY N/A 2019    Procedure: EXTRACTION TEETH #1,2,7,8,10,15,16,17;  Surgeon: Christina Arias DMD;  [facility];  Service: Maxillofacial       History reviewed. No pertinent family history.  I have reviewed and agree with the history as documented.    E-Cigarette/Vaping    E-Cigarette Use Former User      E-Cigarette/Vaping Substances    Nicotine Yes     THC Yes     CBD No     Flavoring Yes      Social History     Tobacco Use    Smoking status: Every Day     Current packs/day: 1.00     Types: Cigarettes    Smokeless tobacco: Former   Vaping Use    Vaping status: Former    Substances: Nicotine, THC, Flavoring   Substance Use Topics    Alcohol use: Not Currently     Comment: socially, rarely    Drug use: Yes     Types: Marijuana       Review of Systems   Constitutional:  Negative for appetite change, chills, diaphoresis, fatigue and fever.    HENT:  Positive for dental problem and facial swelling. Negative for congestion, drooling, mouth sores, rhinorrhea, sinus pressure, sinus pain and trouble swallowing.    Respiratory:  Negative for cough, chest tightness, shortness of breath and wheezing.    Cardiovascular:  Negative for chest pain, palpitations and leg swelling.   Gastrointestinal:  Negative for abdominal pain, constipation, diarrhea, nausea and vomiting.   Musculoskeletal:  Negative for neck pain and neck stiffness.   Skin:  Negative for rash and wound.   Neurological:  Negative for dizziness, seizures, syncope, weakness, light-headedness, numbness and headaches.   Psychiatric/Behavioral:  Negative for confusion.    All other systems reviewed and are negative.      Physical Exam  Physical Exam  Vitals and nursing note reviewed.   Constitutional:       General: He is not in acute distress.     Appearance: Normal appearance. He is normal weight. He is not ill-appearing, toxic-appearing or diaphoretic.   HENT:      Head: Normocephalic and atraumatic.      Nose: Nose normal. No congestion or rhinorrhea.      Mouth/Throat:      Mouth: Mucous membranes are moist.      Pharynx: No oropharyngeal exudate or posterior oropharyngeal erythema.      Comments: Overall poor dentition with numerous dental caries and broken teeth; the patient has what appears to be a left upper dentition dental abscess that does not appear to be drainable on examination  The patient has significant left-sided facial swelling, redness, and warmth extends up to his eye  Steer oropharynx is patent; he has no difficulty opening his mouth or swallowing; he tolerates his secretions without difficulty; no trismus or drooling  Eyes:      General: No scleral icterus.        Right eye: No discharge.         Left eye: No discharge.      Conjunctiva/sclera: Conjunctivae normal.      Comments: The patient has no conjunctival injection or scleral injection to the left eye; no pain on  extraocular motions; no proptosis   Cardiovascular:      Rate and Rhythm: Normal rate and regular rhythm.      Pulses: Normal pulses.      Heart sounds: Normal heart sounds. No murmur heard.     No friction rub. No gallop.   Pulmonary:      Effort: Pulmonary effort is normal. No respiratory distress.      Breath sounds: Normal breath sounds. No stridor. No wheezing, rhonchi or rales.   Chest:      Chest wall: No tenderness.   Musculoskeletal:         General: Normal range of motion.      Cervical back: Normal range of motion. No rigidity.      Right lower leg: No edema.      Left lower leg: No edema.   Skin:     General: Skin is warm and dry.      Capillary Refill: Capillary refill takes less than 2 seconds.      Coloration: Skin is not jaundiced or pale.   Neurological:      General: No focal deficit present.      Mental Status: He is alert and oriented to person, place, and time. Mental status is at baseline.   Psychiatric:         Mood and Affect: Mood normal.         Behavior: Behavior normal.         Vital Signs  ED Triage Vitals   Temperature Pulse Respirations Blood Pressure SpO2   04/09/24 1255 04/09/24 1255 04/09/24 1255 04/09/24 1255 04/09/24 1255   97.9 °F (36.6 °C) 78 16 141/77 96 %      Temp Source Heart Rate Source Patient Position - Orthostatic VS BP Location FiO2 (%)   04/09/24 1255 04/09/24 1255 04/09/24 1255 04/09/24 1255 --   Oral Monitor Lying Right arm       Pain Score       04/09/24 1329       4           Vitals:    04/09/24 1255   BP: 141/77   Pulse: 78   Patient Position - Orthostatic VS: Lying         Visual Acuity      ED Medications  Medications   ketorolac (TORADOL) injection 15 mg (15 mg Intravenous Given 4/9/24 1329)   iohexol (OMNIPAQUE) 350 MG/ML injection (SINGLE-DOSE) 100 mL (100 mL Intravenous Given 4/9/24 1414)   ampicillin-sulbactam (UNASYN) 3 g in sodium chloride 0.9 % 100 mL IVPB (0 g Intravenous Stopped 4/9/24 1642)       Diagnostic Studies  Results Reviewed       Procedure  Component Value Units Date/Time    Blood culture [[id number]] Collected: 04/09/24 1323    Lab Status: Preliminary result Specimen: Blood from Arm, Right Updated: 04/09/24 2001     Blood Culture Received in Microbiology Lab. Culture in Progress.    Blood culture [[id number]] Collected: 04/09/24 1323    Lab Status: Preliminary result Specimen: Blood from Arm, Right Updated: 04/09/24 2001     Blood Culture Received in Microbiology Lab. Culture in Progress.    Procalcitonin [[id number]]  (Normal) Collected: 04/09/24 1323    Lab Status: Final result Specimen: Blood from Arm, Right Updated: 04/09/24 1410     Procalcitonin 0.05 ng/ml     Comprehensive metabolic panel [[id number]] Collected: 04/09/24 1323    Lab Status: Final result Specimen: Blood from Arm, Right Updated: 04/09/24 1405     Sodium 140 mmol/L      Potassium 4.1 mmol/L      Chloride 105 mmol/L      CO2 27 mmol/L      ANION GAP 8 mmol/L      BUN 10 mg/dL      Creatinine 1.00 mg/dL      Glucose 114 mg/dL      Calcium 9.2 mg/dL      AST 15 U/L      ALT 40 U/L      Alkaline Phosphatase 78 U/L      Total Protein 7.2 g/dL      Albumin 4.2 g/dL      Total Bilirubin 0.67 mg/dL      eGFR 99 ml/min/1.73sq m     Narrative:      National Kid[employer] Disease Foundation guidelines for Chronic Kidney Disease (CKD):     Stage 1 with normal or high GFR (GFR > 90 mL/min/1.73 square meters)    Stage 2 Mild CKD (GFR = 60-89 mL/min/1.73 square meters)    Stage 3A Moderate CKD (GFR = 45-59 mL/min/1.73 square meters)    Stage 3B Moderate CKD (GFR = 30-44 mL/min/1.73 square meters)    Stage 4 Severe CKD (GFR = 15-29 mL/min/1.73 square meters)    Stage 5 End Stage CKD (GFR <15 mL/min/1.73 square meters)  Note: GFR calculation is accurate only with a steady state creatinine    CBC and differential [[id number]] Collected: 04/09/24 1323    Lab Status: Final result Specimen: Blood from Arm, Right Updated: 04/09/24 1339     WBC 8.94 Thousand/uL      RBC 4.64 Million/uL      Hemoglobin 14.9 g/dL      " Hematocrit 43.0 %      MCV 93 fL      MCH 32.1 pg      MCHC 34.7 g/dL      RDW 12.3 %      MPV 11.9 fL      Platelets 208 Thousands/uL      nRBC 0 /100 WBCs      Segmented % 67 %      Immature Grans % 0 %      Lymphocytes % 22 %      Monocytes % 8 %      Eosinophils Relative 2 %      Basophils Relative 1 %      Absolute Neutrophils 5.95 Thousands/µL      Absolute Immature Grans 0.03 Thousand/uL      Absolute Lymphocytes 1.94 Thousands/µL      Absolute Monocytes 0.74 Thousand/µL      Eosinophils Absolute 0.21 Thousand/µL      Basophils Absolute 0.07 Thousands/µL                    CT facial bones w contrast   Final Result by Henry Singh MD (04/09 5693)         1. Left maxillary facial cellulitis likely due to underlying extensive dental disease, particularly suspected is markedly decayed tooth 13 with periapical lucency and buccal alveolar ridge cortical breakthrough with possible small subperiosteal or    periodontal abscess.   2. Chronic left greater than right maxillary sinusitis.               Workstation performed: [id number]                    Procedures  Procedures         ED Course  ED Course as of 04/09/24 2021   Tue Apr 09, 2024   1528 Per Dr. Barrett with [facility]: \"Have him F/U in our office tomorrow AM  NPO after midnight  Please give Unasyn while he's there  No steroids please.  Call [facility] now to come in tomorrow at our [place] office as an emergency patient for tomorrow.\"                               SBIRT 20yo+      Flowsheet Row Most Recent Value   Initial Alcohol Screen: US AUDIT-C     1. How often do you have a drink containing alcohol? 1 Filed at: 04/09/2024 1301   Audit-C Score 1 Filed at: 04/09/2024 1301   KVNG: How many times in the past year have you...    Used an illegal drug or used a prescription medication for non-medical reasons? Never Filed at: 04/09/2024 1301                      Medical Decision Making  33-year-old male presenting to the emergency department today " "for left-sided facial swelling.  This is associated with left upper dentition pain.  History of dental abscesses.  Vital signs are stable.  Afebrile.  No systemic signs of infection.  On physical examination, the patient has significant left-sided facial swelling that tracks up towards the eye.  He has no pain on extraocular motion or proptosis.  His dentition is poor and he appears to have an abscess to the left upper dentition.  Labs show no leukocytosis.  Negative procalcitonin.  Blood cultures were drawn.  Left maxillary facial cellulitis likely due to underlying dental disease was noted on CT facial bones with contrast.  I discussed the patient with Dr. Barrett.  He recommends outpatient follow-up.  He recommends the patient is n.p.o. after midnight which I discussed with the patient while here in the emergency department.  He recommended a dose of Unasyn which was provided to the patient.  The patient is stable for discharge at this time.  I gave the patient information to follow-up outpatient with Dr. Barrett.  He is aware to return to the emergency department for worsening symptoms.  Strict return precautions were given.  Recommend PCP follow-up as soon as possible. The patient and/or patient's proxy verify their understanding and agree to the plan at this time.  All questions answered to the patient and/or their proxy's satisfaction.  All labs reviewed and utilized in the medical decision making process (if labs were ordered).  Portions of the record may have been created with voice recognition software.  Occasional wrong word or \"sound a like\" substitutions may have occurred due to the inherent limitations of voice recognition software.  Read the chart carefully and recognize, using context, where substitutions have occurred.    I reviewed prior notes.    Problems Addressed:  Dental abscess: undiagnosed new problem with uncertain prognosis  Facial cellulitis: undiagnosed new problem with uncertain " prognosis    Amount and/or Complexity of Data Reviewed  External Data Reviewed: notes.  Labs: ordered. Decision-making details documented in ED Course.  Radiology: ordered. Decision-making details documented in ED Course.  Discussion of management or test interpretation with external provider(s): Dr. Barrett - [facility]  Prescription drug management.             Disposition  Final diagnoses:   Dental abscess   Facial cellulitis     Time reflects when diagnosis was documented in both MDM as applicable and the Disposition within this note       Time User Action Codes Description Comment    4/9/2024  4:18 PM Naveen Willams Add [K04.7] Dental abscess     4/9/2024  4:18 PM Naveen Willams Add [L03.211] Facial cellulitis     4/9/2024  4:18 PM Naveen Willams Modify [K04.7] Dental abscess     4/9/2024  4:18 PM Naveen Willams Modify [L03.211] Facial cellulitis           ED Disposition       ED Disposition   Discharge    Condition   Stable    Date/Time   Tue Apr 9, 2024 1618    Comment   Bernardo Barksdale Jr. discharge to home/self care.                   Follow-up Information       Follow up With Specialties Details Why Contact Info Additional Information    [facility] Emergency Department Emergency Medicine   [address] [address]  [phone] [facility] Emergency Department, [address] [place] [id number]    Kris Barrett DMD Oral Maxillofacial Surgery Call   [address] [place] [address]   [address] [place] [id number] [phone]               Discharge Medication List as of 4/9/2024  4:23 PM        START taking these medications    Details   clindamycin (CLEOCIN) 150 mg capsule Take 3 capsules (450 mg total) by mouth every 6 (six) hours for 5 days, Starting Tue 4/9/2024, Until Sun 4/14/2024, Normal           CONTINUE these medications which have NOT CHANGED    Details   acetaminophen (TYLENOL) 500 mg tablet Take 2  tablets (1,000 mg total) by mouth every 8 (eight) hours as needed for mild pain, Starting Mon 2/12/2024, Normal      chlorhexidine (PERIDEX) 0.12 % solution Apply 15 mL to the mouth or throat 2 (two) times a day, Starting Mon 2/12/2024, Normal      fluticasone (FLONASE) 50 mcg/act nasal spray 1 spray into each nostril daily, Starting Wed 5/31/2023, Normal      methocarbamol (ROBAXIN) 500 mg tablet Take 1 tablet (500 mg total) by mouth 2 (two) times a day for 7 days, Starting Wed 5/31/2023, Until Wed 6/7/2023, Normal      naproxen (NAPROSYN) 250 mg tablet Take 1 tablet (250 mg total) by mouth 2 (two) times a day with meals, Starting Sun 12/4/2022, Print      nicotine (NICODERM CQ) 21 mg/24 hr TD 24 hr patch Place 1 patch on the skin daily, Starting u 5/30/2019, Print             No discharge procedures on file.    PDMP Review         Value Time User    PDMP Reviewed  Yes 11/6/2019  3:36 AM Navi Merlos PA-C            ED Provider  Electronically Signed by             Naveen Willams PA-C  04/09/24 2021

## 2024-04-14 LAB
BACTERIA BLD CULT: NORMAL
BACTERIA BLD CULT: NORMAL

## 2024-04-15 ENCOUNTER — OFFICE VISIT (OUTPATIENT)
Dept: CARDIOLOGY CLINIC | Facility: CLINIC | Age: 34
End: 2024-04-15

## 2024-04-15 VITALS
DIASTOLIC BLOOD PRESSURE: 78 MMHG | WEIGHT: 222 LBS | HEART RATE: 73 BPM | BODY MASS INDEX: 31.78 KG/M2 | SYSTOLIC BLOOD PRESSURE: 116 MMHG | OXYGEN SATURATION: 98 % | HEIGHT: 70 IN

## 2024-04-15 DIAGNOSIS — R07.9 ACUTE CHEST PAIN: ICD-10-CM

## 2024-04-15 DIAGNOSIS — I45.10 RBBB: ICD-10-CM

## 2024-04-15 DIAGNOSIS — R07.89 ATYPICAL CHEST PAIN: Primary | ICD-10-CM

## 2024-04-15 PROCEDURE — 99204 OFFICE O/P NEW MOD 45 MIN: CPT | Performed by: INTERNAL MEDICINE

## 2024-04-15 NOTE — PROGRESS NOTES
Cardiology   Bernardo Barksdale Jr. 33 y.o. male MRN: [id number]        Impression:  Chest pain - atypical.  More concerning for musculoskeletal etiology.   RBBB - benign.  Present in 2019, and on echo in 2017.  Given no obvious structural heart disease, no cardiac symptoms, and chronicity of RBBB, may be normal variant.  No further w/u necessary.     Recommendations:  No further cardiac w/u necessary at this time.    Follow up on an as needed basis.  Patient at acceptable cardiovascular risk to proceed with any surgical procedure or medications needed for his teeth.       HPI: Bernardo Barksdale Jr. is a 33 y.o. year old male with no prior cardiac history who presented to ED 3/30/24 with chest pain.  ECG demonstrated NSR 89 with RBBB.  ECG 2019 demonstrated RBBB.  Echo 2017 - NL LV/RV size and systolic function, mild TR.  Occasionally has sharp chest pain less than 5 seconds. Went to ED - had numbness in L side of body went to ED.  No concerns besides RBBB. Does smoke 1/2 day.  Has significant teeth issues.       Review of Systems   Constitutional: Negative.    HENT:  Positive for dental problem.    Eyes: Negative.    Respiratory:  Negative for chest tightness and shortness of breath.    Cardiovascular:  Positive for chest pain. Negative for palpitations and leg swelling.   Gastrointestinal: Negative.    Endocrine: Negative.    Genitourinary: Negative.    Musculoskeletal: Negative.    Skin: Negative.    Allergic/Immunologic: Negative.    Neurological: Negative.    Hematological: Negative.    Psychiatric/Behavioral: Negative.     All other systems reviewed and are negative.        No past medical history on file.  Past Surgical History:   Procedure Laterality Date    INCISION AND DRAINAGE INTRA ORAL ABSCESS Right 5/27/2019    Procedure: INCISION AND DRAINAGE  (I&D) R CANINE SPACE;  Surgeon: Christina Arias DMD;  [facility];  Service: Maxillofacial    REMOVAL OF IMPACTED TOOTH - COMPLETELY BONY N/A  5/27/2019    Procedure: EXTRACTION TEETH #1,2,7,8,10,15,16,17;  Surgeon: Christina Arias DMD;  [facility];  Service: Maxillofacial     Social History     Substance and Sexual Activity   Alcohol Use Not Currently    Comment: socially, rarely     Social History     Substance and Sexual Activity   Drug Use Yes    Types: Marijuana     Social History     Tobacco Use   Smoking Status Every Day    Current packs/day: 1.00    Types: Cigarettes   Smokeless Tobacco Former     No family history on file.    Allergies:  No Known Allergies    Medications:     Current Outpatient Medications:     acetaminophen (TYLENOL) 500 mg tablet, Take 2 tablets (1,000 mg total) by mouth every 8 (eight) hours as needed for mild pain, Disp: 60 tablet, Rfl: 0    clindamycin (CLEOCIN) 150 mg capsule, Take 3 capsules (450 mg total) by mouth every 6 (six) hours for 5 days, Disp: 60 capsule, Rfl: 0    chlorhexidine (PERIDEX) 0.12 % solution, Apply 15 mL to the mouth or throat 2 (two) times a day (Patient not taking: Reported on 4/15/2024), Disp: 120 mL, Rfl: 0    fluticasone (FLONASE) 50 mcg/act nasal spray, 1 spray into each nostril daily (Patient not taking: Reported on 2/12/2024), Disp: 16 g, Rfl: 0    methocarbamol (ROBAXIN) 500 mg tablet, Take 1 tablet (500 mg total) by mouth 2 (two) times a day for 7 days, Disp: 14 tablet, Rfl: 0    naproxen (NAPROSYN) 250 mg tablet, Take 1 tablet (250 mg total) by mouth 2 (two) times a day with meals (Patient not taking: Reported on 5/31/2023), Disp: 20 tablet, Rfl: 0    nicotine (NICODERM CQ) 21 mg/24 hr TD 24 hr patch, Place 1 patch on the skin daily (Patient not taking: Reported on 9/2/2019), Disp: 28 patch, Rfl: 1      Wt Readings from Last 3 Encounters:   04/15/24 101 kg (222 lb)   02/12/24 101 kg (223 lb 12.3 oz)   05/31/23 99.5 kg (219 lb 4.8 oz)     Temp Readings from Last 3 Encounters:   04/09/24 97.9 °F (36.6 °C) (Oral)   03/30/24 98.4 °F (36.9 °C) (Oral)   02/12/24 98.5 °F (36.9 °C)  (Oral)     BP Readings from Last 3 Encounters:   04/15/24 116/78   04/09/24 141/77   03/31/24 150/75     Pulse Readings from Last 3 Encounters:   04/15/24 73   04/09/24 78   03/31/24 81         Physical Exam  HENT:      Head: Atraumatic.   Eyes:      Extraocular Movements: Extraocular movements intact.   Cardiovascular:      Rate and Rhythm: Normal rate and regular rhythm.      Heart sounds: Normal heart sounds.   Pulmonary:      Effort: Pulmonary effort is normal.      Breath sounds: Normal breath sounds.   Abdominal:      General: Abdomen is flat.   Musculoskeletal:         General: Normal range of motion.      Cervical back: Normal range of motion.   Skin:     General: Skin is warm.   Neurological:      General: No focal deficit present.      Mental Status: He is alert and oriented to person, place, and time.   Psychiatric:         Mood and Affect: Mood normal.         Behavior: Behavior normal.           Laboratory Studies:  CMP:  Lab Results   Component Value Date    K 4.1 04/09/2024     04/09/2024    CO2 27 04/09/2024    BUN 10 04/09/2024    CREATININE 1.00 04/09/2024    AST 15 04/09/2024    ALT 40 04/09/2024    EGFR 99 04/09/2024           Cardiac testing:   EKG reviewed personally: (3/30/24) NSR 89 RBBB

## 2024-04-15 NOTE — PATIENT INSTRUCTIONS
Recommendations:  No further cardiac w/u necessary at this time.    Follow up on an as needed basis.  Patient at acceptable cardiovascular risk to proceed with any surgical procedure or medications needed for his teeth.

## 2024-09-12 ENCOUNTER — APPOINTMENT (EMERGENCY)
Dept: RADIOLOGY | Facility: HOSPITAL | Age: 34
End: 2024-09-12

## 2024-09-12 ENCOUNTER — HOSPITAL ENCOUNTER (EMERGENCY)
Facility: HOSPITAL | Age: 34
Discharge: HOME | End: 2024-09-12
Attending: EMERGENCY MEDICINE

## 2024-09-12 VITALS
HEART RATE: 72 BPM | TEMPERATURE: 96 F | RESPIRATION RATE: 18 BRPM | SYSTOLIC BLOOD PRESSURE: 133 MMHG | DIASTOLIC BLOOD PRESSURE: 72 MMHG | OXYGEN SATURATION: 97 %

## 2024-09-12 DIAGNOSIS — R68.89 FLU-LIKE SYMPTOMS: Primary | ICD-10-CM

## 2024-09-12 LAB
FLUAV AG UPPER RESP QL IA.RAPID: NEGATIVE
FLUBV AG UPPER RESP QL IA.RAPID: NEGATIVE
SARS-COV+SARS-COV-2 AG RESP QL IA.RAPID: NEGATIVE

## 2024-09-12 PROCEDURE — 87811 SARS-COV-2 COVID19 W/OPTIC: CPT | Performed by: EMERGENCY MEDICINE

## 2024-09-12 PROCEDURE — 99284 EMERGENCY DEPT VISIT MOD MDM: CPT | Performed by: EMERGENCY MEDICINE

## 2024-09-12 PROCEDURE — 71045 X-RAY EXAM CHEST 1 VIEW: CPT

## 2024-09-12 PROCEDURE — 96372 THER/PROPH/DIAG INJ SC/IM: CPT

## 2024-09-12 PROCEDURE — 87804 INFLUENZA ASSAY W/OPTIC: CPT | Performed by: EMERGENCY MEDICINE

## 2024-09-12 PROCEDURE — 99284 EMERGENCY DEPT VISIT MOD MDM: CPT

## 2024-09-12 RX ORDER — FLUTICASONE PROPIONATE 50 MCG
2 SPRAY, SUSPENSION (ML) NASAL DAILY
Status: DISCONTINUED | OUTPATIENT
Start: 2024-09-12 | End: 2024-09-13 | Stop reason: HOSPADM

## 2024-09-12 RX ORDER — AZELASTINE 1 MG/ML
1 SPRAY, METERED NASAL 2 TIMES DAILY
Qty: 1 ML | Refills: 0 | Status: SHIPPED | OUTPATIENT
Start: 2024-09-12

## 2024-09-12 RX ORDER — NAPROXEN 500 MG/1
500 TABLET ORAL 2 TIMES DAILY WITH MEALS
Qty: 30 TABLET | Refills: 0 | Status: SHIPPED | OUTPATIENT
Start: 2024-09-12

## 2024-09-12 RX ORDER — KETOROLAC TROMETHAMINE 30 MG/ML
15 INJECTION, SOLUTION INTRAMUSCULAR; INTRAVENOUS ONCE
Status: COMPLETED | OUTPATIENT
Start: 2024-09-12 | End: 2024-09-12

## 2024-09-12 RX ADMIN — KETOROLAC TROMETHAMINE 15 MG: 30 INJECTION, SOLUTION INTRAMUSCULAR at 22:41

## 2024-09-12 RX ADMIN — FLUTICASONE PROPIONATE 2 SPRAY: 50 SPRAY, METERED NASAL at 22:53

## 2024-09-12 NOTE — Clinical Note
Bernardo Barksdale was seen and treated in our emergency department on 9/12/2024.                Diagnosis: Flu symptoms    Bernardo  may return to work on return date.    He may return on this date: 09/14/2024         If you have any questions or concerns, please don't hesitate to call.      Benitez Nguyen, DO    ______________________________           _______________          _______________  Hospital Representative                              Date                                Time

## 2024-09-13 NOTE — ED PROVIDER NOTES
1. Flu-like symptoms      ED Disposition       ED Disposition   Discharge    Condition   Stable    Date/Time   Thu Sep 12, 2024 10:41 PM    Comment   Bernardo Barksdale Jr. discharge to home/self care.                   Assessment & Plan       Medical Decision Making  Well-appearing male with flulike symptoms.  He is a smoker.  No adventitious breath sounds to suggest pneumonia but will obtain chest x-ray to rule out pneumonia.  COVID and flu testing.  Throat is nonerythematous without exudates.  No sign of strep throat.  Will give return precautions.    COVID, flu testing negative.  Chest x-ray without a pneumonia.  Will discharge home.    Problems Addressed:  Flu-like symptoms: acute illness or injury    Amount and/or Complexity of Data Reviewed  Labs: ordered.    Risk  Prescription drug management.                       Medications   fluticasone (FLONASE) 50 mcg/act nasal spray 2 spray (has no administration in time range)   ketorolac (TORADOL) injection 15 mg (15 mg Intramuscular Given 9/12/24 7730)       History of Present Illness       33-year-old male presenting with flulike symptoms.  Headache, myalgias, fatigue, cough, rhinorrhea starting today.    No other symptoms.      Flu Symptoms  Presenting symptoms: cough, fatigue, headache, myalgias, rhinorrhea and sore throat          Review of Systems   Constitutional:  Positive for fatigue.   HENT:  Positive for rhinorrhea and sore throat.    Respiratory:  Positive for cough.    Musculoskeletal:  Positive for myalgias.   Neurological:  Positive for headaches.   All other systems reviewed and are negative.          Objective     ED Triage Vitals [09/12/24 2153]   Temperature Pulse Blood Pressure Respirations SpO2 Patient Position - Orthostatic VS   (!) 96 °F (35.6 °C) 72 133/72 18 97 % Lying      Temp Source Heart Rate Source BP Location FiO2 (%) Pain Score    Oral -- Left arm -- --        Physical Exam  Vitals and nursing note reviewed.   Constitutional:        General: He is not in acute distress.     Appearance: He is well-developed. He is not diaphoretic.   HENT:      Head: Normocephalic and atraumatic.      Right Ear: External ear normal.      Left Ear: External ear normal.      Nose: Congestion present.      Mouth/Throat:      Pharynx: No oropharyngeal exudate or posterior oropharyngeal erythema.   Eyes:      Conjunctiva/sclera: Conjunctivae normal.   Neck:      Trachea: No tracheal deviation.   Cardiovascular:      Rate and Rhythm: Normal rate and regular rhythm.      Heart sounds: Normal heart sounds. No murmur heard.  Pulmonary:      Effort: No respiratory distress.      Breath sounds: Normal breath sounds. No stridor. No wheezing or rales.   Abdominal:      General: Bowel sounds are normal. There is no distension.      Palpations: Abdomen is soft. There is no mass.      Tenderness: There is no abdominal tenderness. There is no guarding or rebound.   Musculoskeletal:         General: No tenderness or deformity.   Skin:     General: Skin is dry.      Findings: No rash.   Neurological:      Motor: No abnormal muscle tone.      Coordination: Coordination normal.   Psychiatric:         Behavior: Behavior normal.         Thought Content: Thought content normal.         Judgment: Judgment normal.         Labs Reviewed   COVID-19/INFLUENZA A/B RAPID ANTIGEN (30 MIN.TAT) - Normal       Result Value    SARS COV Rapid Antigen Negative      Influenza A Rapid Antigen Negative      Influenza B Rapid Antigen Negative      Narrative:     This test has been performed using the Quidel Saba 2 FLU+SARS Antigen test under the Emergency Use Authorization (EUA). This test has been validated by the  and verified by the performing laboratory. The Saba uses lateral flow immunofluorescent sandwich assay to detect SARS-COV, Influenza A and Influenza B Antigen.     The Quidel Saba 2 SARS Antigen test does not differentiate between SARS-CoV and SARS-CoV-2.     Negative results  are presumptive and may be confirmed with a molecular assay, if necessary, for patient management. Negative results do not rule out SARS-CoV-2 or influenza infection and should not be used as the sole basis for treatment or patient management decisions. A negative test result may occur if the level of antigen in a sample is below the limit of detection of this test.     Positive results are indicative of the presence of viral antigens, but do not rule out bacterial infection or co-infection with other viruses.     All test results should be used as an adjunct to clinical observations and other information available to the provider.    FOR PEDIATRIC PATIENTS - copy/paste COVID Guidelines URL to browser: [email or web address]     XR chest 1 view portable   ED Interpretation by Benitez Nguyen DO (09/12 2240)   No acute cardiopulmonary findings          Procedures       Benitez Nguyen DO  09/12/24 2990

## 2024-09-13 NOTE — DISCHARGE INSTRUCTIONS
Chest x-ray showed no significant findings.    COVID, flu test were negative.    Use the Flonase 2 sprays in each nostril each morning.  You will take approximately 4 days prior to have some effect.  Use the azelastine spray 1 spray in each nostril daily.  This will decrease some of your symptoms the same day use it.    Take the naproxen twice daily as needed for aches and pains.      All these medications are over-the-counter and if your insurance does not pay for them you can obtain them over-the-counter.

## 2025-02-05 ENCOUNTER — TELEPHONE (OUTPATIENT)
Age: 35
End: 2025-02-05

## 2025-02-05 NOTE — TELEPHONE ENCOUNTER
"Hello,    The following message was sent via e-mail to the leadership team:     Please advise if you can help facilitate the following overbook request:    Patient Name: Bernardo Barksdale JR    Patient MRN: [id number]     Call back #: [id number]    Insurance: [employer]    Department:Cardiology    Specialty: General Cardiology    Reason for overbook request: CARDIAC CLEARANCE PRIOR TO PROCEDURE (14-30 DAYS PRIOR TO PROCEDURE)    Comments (Write \"N/a\" if no comments): Patient is in need of tooth extraction and dentures.  He is estalbished with Dr. Elam, lives in [place] and works evenings.  Dental office will not schedule dental surgery until he is cleared by cardiology.  He was last seen by Dr. Elam in April 2024 and was advised he could be seen on an as needed basis.    Requested doctor and location: Dr. Elam/[facility]    Date of current appointment: 2/20/2025      Thank you.      "

## 2025-08-06 ENCOUNTER — APPOINTMENT (EMERGENCY)
Dept: RADIOLOGY | Facility: HOSPITAL | Age: 35
End: 2025-08-06
Payer: OTHER MISCELLANEOUS

## 2025-08-06 ENCOUNTER — HOSPITAL ENCOUNTER (EMERGENCY)
Facility: HOSPITAL | Age: 35
Discharge: HOME | End: 2025-08-06
Attending: EMERGENCY MEDICINE
Payer: OTHER MISCELLANEOUS